# Patient Record
Sex: MALE | Race: WHITE | NOT HISPANIC OR LATINO | Employment: UNEMPLOYED | URBAN - METROPOLITAN AREA
[De-identification: names, ages, dates, MRNs, and addresses within clinical notes are randomized per-mention and may not be internally consistent; named-entity substitution may affect disease eponyms.]

---

## 2023-09-19 ENCOUNTER — HOSPITAL ENCOUNTER (INPATIENT)
Facility: HOSPITAL | Age: 18
LOS: 17 days | Discharge: NURSING, REHAB OR LONG-TERM CARE | DRG: 753 | End: 2023-10-09
Attending: EMERGENCY MEDICINE | Admitting: PSYCHIATRY & NEUROLOGY
Payer: COMMERCIAL

## 2023-09-19 DIAGNOSIS — Z00.8 ENCOUNTER FOR PSYCHOLOGICAL EVALUATION: ICD-10-CM

## 2023-09-19 DIAGNOSIS — E55.9 VITAMIN D DEFICIENCY: ICD-10-CM

## 2023-09-19 DIAGNOSIS — R46.89 AGGRESSION: ICD-10-CM

## 2023-09-19 DIAGNOSIS — F39 MOOD DISORDER (HCC): Primary | ICD-10-CM

## 2023-09-19 DIAGNOSIS — F41.9 ANXIETY: ICD-10-CM

## 2023-09-19 LAB
AMPHETAMINES SERPL QL SCN: NEGATIVE
BARBITURATES UR QL: NEGATIVE
BENZODIAZ UR QL: NEGATIVE
COCAINE UR QL: NEGATIVE
ETHANOL EXG-MCNC: 0 MG/DL
METHADONE UR QL: NEGATIVE
OPIATES UR QL SCN: NEGATIVE
OXYCODONE+OXYMORPHONE UR QL SCN: NEGATIVE
PCP UR QL: NEGATIVE
THC UR QL: NEGATIVE

## 2023-09-19 PROCEDURE — 99283 EMERGENCY DEPT VISIT LOW MDM: CPT

## 2023-09-19 PROCEDURE — 80307 DRUG TEST PRSMV CHEM ANLYZR: CPT | Performed by: EMERGENCY MEDICINE

## 2023-09-19 PROCEDURE — 99285 EMERGENCY DEPT VISIT HI MDM: CPT | Performed by: EMERGENCY MEDICINE

## 2023-09-19 PROCEDURE — 82075 ASSAY OF BREATH ETHANOL: CPT | Performed by: EMERGENCY MEDICINE
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
## 2023-09-20 PROCEDURE — 99245 OFF/OP CONSLTJ NEW/EST HI 55: CPT | Performed by: PSYCHIATRY & NEUROLOGY

## 2023-09-20 RX ORDER — RISPERIDONE 1 MG/1
1 TABLET, ORALLY DISINTEGRATING ORAL
Status: DISCONTINUED | OUTPATIENT
Start: 2023-09-20 | End: 2023-09-23

## 2023-09-20 RX ORDER — BUSPIRONE HYDROCHLORIDE 15 MG/1
15 TABLET ORAL 2 TIMES DAILY
Status: DISCONTINUED | OUTPATIENT
Start: 2023-09-20 | End: 2023-10-09 | Stop reason: HOSPADM

## 2023-09-20 RX ORDER — FLUOXETINE HYDROCHLORIDE 40 MG/1
40 CAPSULE ORAL DAILY
COMMUNITY
End: 2023-10-09

## 2023-09-20 RX ORDER — BUSPIRONE HYDROCHLORIDE 15 MG/1
15 TABLET ORAL 2 TIMES DAILY
Status: ON HOLD | COMMUNITY
End: 2023-10-09 | Stop reason: SDUPTHER

## 2023-09-20 RX ORDER — HALOPERIDOL 5 MG/ML
5 INJECTION INTRAMUSCULAR
Status: DISCONTINUED | OUTPATIENT
Start: 2023-09-20 | End: 2023-09-22

## 2023-09-20 RX ORDER — TRAZODONE HYDROCHLORIDE 50 MG/1
50 TABLET ORAL
Status: DISCONTINUED | OUTPATIENT
Start: 2023-09-20 | End: 2023-10-07

## 2023-09-20 RX ORDER — BENZTROPINE MESYLATE 1 MG/ML
1 INJECTION INTRAMUSCULAR; INTRAVENOUS EVERY 6 HOURS PRN
Status: DISCONTINUED | OUTPATIENT
Start: 2023-09-20 | End: 2023-09-22

## 2023-09-20 RX ORDER — HALOPERIDOL 5 MG/ML
2 INJECTION INTRAMUSCULAR
Status: DISCONTINUED | OUTPATIENT
Start: 2023-09-20 | End: 2023-09-22

## 2023-09-20 RX ORDER — BENZTROPINE MESYLATE 1 MG/1
1 TABLET ORAL EVERY 6 HOURS PRN
Status: DISCONTINUED | OUTPATIENT
Start: 2023-09-20 | End: 2023-09-22

## 2023-09-20 RX ORDER — RISPERIDONE 1 MG/1
1 TABLET, ORALLY DISINTEGRATING ORAL
Status: DISCONTINUED | OUTPATIENT
Start: 2023-09-20 | End: 2023-09-22

## 2023-09-20 RX ORDER — FLUOXETINE 20 MG/5ML
40 SOLUTION ORAL DAILY
Status: DISCONTINUED | OUTPATIENT
Start: 2023-09-20 | End: 2023-09-22 | Stop reason: SDUPTHER

## 2023-09-20 RX ORDER — TRAZODONE HYDROCHLORIDE 50 MG/1
50 TABLET ORAL
COMMUNITY
End: 2023-10-09

## 2023-09-20 RX ORDER — RISPERIDONE 0.5 MG/1
0.5 TABLET ORAL
Status: DISCONTINUED | OUTPATIENT
Start: 2023-09-20 | End: 2023-09-22

## 2023-09-20 RX ADMIN — BUSPIRONE HYDROCHLORIDE 15 MG: 10 TABLET ORAL at 10:30

## 2023-09-20 RX ADMIN — TRAZODONE HYDROCHLORIDE 50 MG: 50 TABLET ORAL at 23:02

## 2023-09-20 RX ADMIN — RISPERIDONE 1 MG: 1 TABLET, ORALLY DISINTEGRATING ORAL at 23:02

## 2023-09-20 RX ADMIN — FLUOXETINE 40 MG: 20 SOLUTION ORAL at 10:30

## 2023-09-20 RX ADMIN — BUSPIRONE HYDROCHLORIDE 15 MG: 10 TABLET ORAL at 18:05

## 2023-09-20 NOTE — ED NOTES
Assumed care for pt. Pt currently sleeping with no s/s of distress observed. Continues on Q15 for safety.      Valentine Ravi RN  09/20/23 0591

## 2023-09-20 NOTE — ED NOTES
Insurance Authorization for admission:   [facility] Phone number: [phone]. Spoke to [facility]  5 days approved. Level of care:  Inpatient admission  Review on **. Authorization # **.

## 2023-09-20 NOTE — ED CARE HANDOFF
Emergency Department Sign Out Note        Sign out and transfer of care from Dr. Lionel Montenegro. See Separate Emergency Department note. The patient, Leslee Hamm, was evaluated by the previous provider for [facility]. Workup Completed:  12     ED Course / Workup Pending (followup):  Pending placement. Procedures  MDM        Disposition  Final diagnoses:   Encounter for psychological evaluation   Aggression     Time reflects when diagnosis was documented in both MDM as applicable and the Disposition within this note     Time User Action Codes Description Comment    9/19/2023  1:56 PM Romel Hannah [address] [Z00.8] Encounter for psychological evaluation     9/19/2023  1:56 PM Byron Barton Add [R46.89] Aggression       ED Disposition     ED Disposition   Transfer to [address] [place] [address]   --    Date/Time   Tue Sep 19, 2023  1:57 PM    Comment   Leslee Hamm should be transferred out to [facility] and has been medically cleared. Follow-up Information    None       Patient's Medications   Discharge Prescriptions    No medications on file     No discharge procedures on file.        ED Provider  Electronically Signed by     Sarah Mustafa MD  09/20/23 3106

## 2023-09-20 NOTE — ED NOTES
Bed search failed due to ID, ASD and reported violence, impulsive behavior and aggressiveness. [facility] may have a small unit approp. for level of need, however, they have no available beds today. Kathy Allen is reviewing.

## 2023-09-20 NOTE — ED NOTES
[place]    Recipient ID: [id number]    [address] [place] [address]  Information Contact  Telephone: [phone]    [facility]  Information Contact  Telephone: [phone]

## 2023-09-20 NOTE — ED NOTES
Pt vitals taken and fluids/snacks given @ bedside. Tolerating well. Pt is comfortably resting and watching t.v. @ this time. All safety precautions are in place and pt has no other needs @ this time.      Janae Schuster  09/19/23 0124
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
## 2023-09-21 PROCEDURE — 99215 OFFICE O/P EST HI 40 MIN: CPT | Performed by: PSYCHIATRY & NEUROLOGY

## 2023-09-21 RX ADMIN — RISPERIDONE 1 MG: 1 TABLET, ORALLY DISINTEGRATING ORAL at 22:03

## 2023-09-21 RX ADMIN — TRAZODONE HYDROCHLORIDE 50 MG: 50 TABLET ORAL at 22:02

## 2023-09-21 RX ADMIN — BUSPIRONE HYDROCHLORIDE 15 MG: 10 TABLET ORAL at 17:51

## 2023-09-21 RX ADMIN — FLUOXETINE 40 MG: 20 SOLUTION ORAL at 09:00

## 2023-09-21 RX ADMIN — BUSPIRONE HYDROCHLORIDE 15 MG: 10 TABLET ORAL at 08:59

## 2023-09-21 NOTE — PROGRESS NOTES
Progress Note - Behavioral Health   Geneva Acosta 19 y.o. male MRN: [id number]  Unit/Bed#: ED 10 Encounter: [id number]    Principal Psychiatric Problem:  1. Autism spectrum disorder  2. Impulse control disorder, unspecified; consideration for intermittent explosive disorders vs. Affective disorder like bipolar disorder     Active Problems: There are no active Hospital Problems. Plan  Discussed plan with primary team as follows:  1. Patient has signed 61 51 81 for voluntary admission, awaiting inpatient psychiatry placement, Please re-consult psychiatry if patient attempts to leave AMA, seeing as criteria is present for 302; patient poses danger to peers   2. Recommend the following changes to psychiatric medications at this time:  a. Continue Risperdal 1 mg QHS for impulsivity and aggression  b. Continue Prozac 40 mg daily   c. Continue Buspar 15 mg BID   d. Continue Trazodone 50 mg QHS   3. Please reach out to on-call Psychiatry team via [employer], or if after hours/Fri/Sat/Sunday contact on-call psychiatric service via [address] [place] [address] ([phone]) with any questions or concerns. ------------------------------------------------------------    Subjective:     Patient was seen and evaluated for continuity of care. The patient was laying comfortably in his bed watching TV. He tells this writer his mood is "doing good". He denies any complaints. He is currently denying suicidal and homicidal ideation. He also denies auditory and visual hallucinations. He denies any adverse effects from starting the Risperdal yesterday. He reports his sleep and appetite are good. He is looking forward to being placed. Psychiatric Review of Systems:  Behavior over the last 24 hours: improved  Sleep: normal  Appetite: adequate  Medication side effects: none verbalized  ROS: Complete review of systems is negative except as noted above.     Vital signs in last 24 hours:  Temp:  [98.9 °F (37.2 °C)] 98.9 °F (37.2 °C)  HR:  [78-96] 94  Resp:  [18-20] 19  BP: (133-169)/(70-89) 169/89    Mental Status Exam:  Appearance:  alert, fair eye contact, appears stated age, appropriate grooming and hygiene, obese, bearded and wearing paper scrubs,  male    Behavior:  calm, cooperative and guarded at times   Motor: no abnormal movements   Speech:  spontaneous, soft, scant and coherent   Mood:  "doing good"   Affect:  constricted   Thought Process:  Organized, logical, goal-directed   Thought Content: no verbalized delusions or overt paranoia   Perceptual disturbances: no reported hallucinations, denies current hallucinations and does not appear to be responding to internal stimuli at this time   Risk Potential: No active or passive suicidal or homicidal ideation was verbalized during interview   Cognition: oriented to self and situation, appears to be below average intelligence and cognition not formally tested   Insight:  Poor   Judgment: Limited     Current Medications: All current medications have been reviewed. Current Facility-Administered Medications   Medication Dose Route Frequency Provider Last Rate   • benztropine  1 mg Intramuscular Q6H PRN Georgia C Holter, DO     • benztropine  1 mg Oral Q6H PRN Yajaira Stevenson MD     • busPIRone  15 mg Oral BID Holly Fonseca MD     • FLUoxetine  40 mg Oral Daily Holly Fonseca MD     • risperiDONE  0.5 mg Oral Q6H PRN Max 3/day Yajaira Stevenson MD      Or   • haloperidol lactate  2 mg Intramuscular Q6H PRN Max 3/day Yajaira Stevenson MD     • risperiDONE  1 mg Oral Q6H PRN Max 3/day Yajaira Stevenson MD      Or   • haloperidol lactate  5 mg Intramuscular Q6H PRN Max 3/day Yajaira Stevenson MD     • risperiDONE  1 mg Oral HS Yajaira Stevenson MD     • traZODone  50 mg Oral HS Holly Fonseca MD         Laboratory results:  I have personally reviewed all pertinent laboratory/tests results.   Recent Results (from the past 48 hour(s))   POCT alcohol breath test    Collection Time: 09/19/23  2:11 PM   Result Value Ref Range    EXTBreath Alcohol 0.000    Rapid drug screen, urine    Collection Time: 09/19/23  4:37 PM   Result Value Ref Range    Amph/Meth UR Negative Negative    Barbiturate Ur Negative Negative    Benzodiazepine Urine Negative Negative    Cocaine Urine Negative Negative    Methadone Urine Negative Negative    Opiate Urine Negative Negative    PCP Ur Negative Negative    THC Urine Negative Negative    Oxycodone Urine Negative Negative        Lizzy Grijalva MD  Psychiatry Residency, PGY-2

## 2023-09-21 NOTE — ED CARE HANDOFF
Emergency Department Sign Out Note        Sign out and transfer of care from Dr. Gudelia Brush. See Separate Emergency Department note. The patient, Elio Oliver, was evaluated by the previous provider for psych eval.    Workup Completed:  See previous provider's note    ED Course / Workup Pending (followup):  Pending bed placement. 201 with grounds for 302 if needed                                  ED Course as of 09/21/23 0619   Wed Sep 20, 2023   2256 [phone], [facility], requesting a call in case patient is d/c     Procedures  Medical Decision Making  Patient is a 57-year-old male who came in for psychiatric evaluation. Is currently pending bed placement. Patient is currently a 12, though there is grounds for 302 if patient attempts to revoke his 201. Quiet throughout the night    Amount and/or Complexity of Data Reviewed  Labs: ordered. Risk  Decision regarding hospitalization. Disposition  Final diagnoses:   Encounter for psychological evaluation   Aggression     Time reflects when diagnosis was documented in both MDM as applicable and the Disposition within this note     Time User Action Codes Description Comment    9/19/2023  1:56 PM [employer], [employer] [Z00.8] Encounter for psychological evaluation     9/19/2023  1:56 PM [employer], [employer] [R46.89] Aggression       ED Disposition     ED Disposition   Transfer to [address] [place] [address]   --    Date/Time   Tue Sep 19, 2023  1:57 PM    Comment   Elio Oliver should be transferred out to [facility] and has been medically cleared. Follow-up Information    None       Patient's Medications   Discharge Prescriptions    No medications on file     No discharge procedures on file.        ED Provider  Electronically Signed by     Eveline Hood PA-C  09/21/23 5016

## 2023-09-21 NOTE — ED NOTES
Updated supervisor in group home, Karen Graffs, with situation.   Keep him updated, at [phone]  Ping Hickman is very supportive and states that his plan is that patient return to the group home when his is ready

## 2023-09-22 PROBLEM — F32.A DEPRESSION: Status: ACTIVE | Noted: 2023-07-21

## 2023-09-22 PROBLEM — F39 MOOD DISORDER (HCC): Status: ACTIVE | Noted: 2023-07-21

## 2023-09-22 PROBLEM — F63.81 INTERMITTENT EXPLOSIVE DISORDER IN ADULT: Status: ACTIVE | Noted: 2023-07-21

## 2023-09-22 PROBLEM — E66.9 OBESITY (BMI 35.0-39.9 WITHOUT COMORBIDITY): Status: ACTIVE | Noted: 2023-09-22

## 2023-09-22 PROBLEM — F90.9 ADHD (ATTENTION DEFICIT HYPERACTIVITY DISORDER): Chronic | Status: ACTIVE | Noted: 2023-07-21

## 2023-09-22 PROBLEM — F84.0 AUTISM SPECTRUM DISORDER: Chronic | Status: ACTIVE | Noted: 2023-09-22

## 2023-09-22 PROBLEM — F39 MOOD DISORDER (HCC): Chronic | Status: ACTIVE | Noted: 2023-07-21

## 2023-09-22 PROBLEM — Z00.8 MEDICAL CLEARANCE FOR PSYCHIATRIC ADMISSION: Status: ACTIVE | Noted: 2023-09-22

## 2023-09-22 PROBLEM — F41.9 ANXIETY: Status: ACTIVE | Noted: 2023-07-21

## 2023-09-22 PROBLEM — F63.81 INTERMITTENT EXPLOSIVE DISORDER IN ADULT: Chronic | Status: ACTIVE | Noted: 2023-07-21

## 2023-09-22 PROBLEM — F90.9 ADHD (ATTENTION DEFICIT HYPERACTIVITY DISORDER): Status: ACTIVE | Noted: 2023-07-21

## 2023-09-22 PROCEDURE — 93005 ELECTROCARDIOGRAM TRACING: CPT

## 2023-09-22 PROCEDURE — 99253 IP/OBS CNSLTJ NEW/EST LOW 45: CPT | Performed by: PHYSICIAN ASSISTANT

## 2023-09-22 PROCEDURE — 99214 OFFICE O/P EST MOD 30 MIN: CPT | Performed by: PSYCHIATRY & NEUROLOGY

## 2023-09-22 RX ORDER — BENZTROPINE MESYLATE 1 MG/1
1 TABLET ORAL
Status: DISCONTINUED | OUTPATIENT
Start: 2023-09-22 | End: 2023-10-09 | Stop reason: HOSPADM

## 2023-09-22 RX ORDER — AMOXICILLIN 250 MG
1 CAPSULE ORAL DAILY PRN
Status: DISCONTINUED | OUTPATIENT
Start: 2023-09-22 | End: 2023-10-09 | Stop reason: HOSPADM

## 2023-09-22 RX ORDER — PROPRANOLOL HYDROCHLORIDE 10 MG/1
10 TABLET ORAL EVERY 8 HOURS PRN
Status: DISCONTINUED | OUTPATIENT
Start: 2023-09-22 | End: 2023-10-09 | Stop reason: HOSPADM

## 2023-09-22 RX ORDER — HYDROXYZINE 50 MG/1
100 TABLET, FILM COATED ORAL
Status: DISCONTINUED | OUTPATIENT
Start: 2023-09-22 | End: 2023-10-09 | Stop reason: HOSPADM

## 2023-09-22 RX ORDER — OLANZAPINE 5 MG/1
5 TABLET ORAL
Status: DISCONTINUED | OUTPATIENT
Start: 2023-09-22 | End: 2023-10-09 | Stop reason: HOSPADM

## 2023-09-22 RX ORDER — OLANZAPINE 10 MG/1
10 TABLET ORAL
Status: DISCONTINUED | OUTPATIENT
Start: 2023-09-22 | End: 2023-10-09 | Stop reason: HOSPADM

## 2023-09-22 RX ORDER — ACETAMINOPHEN 325 MG/1
975 TABLET ORAL EVERY 6 HOURS PRN
Status: DISCONTINUED | OUTPATIENT
Start: 2023-09-22 | End: 2023-10-09 | Stop reason: HOSPADM

## 2023-09-22 RX ORDER — BENZTROPINE MESYLATE 1 MG/ML
1 INJECTION INTRAMUSCULAR; INTRAVENOUS
Status: DISCONTINUED | OUTPATIENT
Start: 2023-09-22 | End: 2023-10-09 | Stop reason: HOSPADM

## 2023-09-22 RX ORDER — OLANZAPINE 10 MG/2ML
10 INJECTION, POWDER, FOR SOLUTION INTRAMUSCULAR
Status: DISCONTINUED | OUTPATIENT
Start: 2023-09-22 | End: 2023-10-09 | Stop reason: HOSPADM

## 2023-09-22 RX ORDER — OLANZAPINE 2.5 MG/1
2.5 TABLET ORAL
Status: DISCONTINUED | OUTPATIENT
Start: 2023-09-22 | End: 2023-10-09 | Stop reason: HOSPADM

## 2023-09-22 RX ORDER — HYDROXYZINE HYDROCHLORIDE 25 MG/1
25 TABLET, FILM COATED ORAL
Status: DISCONTINUED | OUTPATIENT
Start: 2023-09-22 | End: 2023-10-09 | Stop reason: HOSPADM

## 2023-09-22 RX ORDER — FLUOXETINE HYDROCHLORIDE 20 MG/1
40 CAPSULE ORAL DAILY
Status: DISCONTINUED | OUTPATIENT
Start: 2023-09-23 | End: 2023-09-23

## 2023-09-22 RX ORDER — DIPHENHYDRAMINE HYDROCHLORIDE 50 MG/ML
50 INJECTION INTRAMUSCULAR; INTRAVENOUS EVERY 6 HOURS PRN
Status: DISCONTINUED | OUTPATIENT
Start: 2023-09-22 | End: 2023-10-09 | Stop reason: HOSPADM

## 2023-09-22 RX ORDER — HYDROXYZINE 50 MG/1
50 TABLET, FILM COATED ORAL
Status: DISCONTINUED | OUTPATIENT
Start: 2023-09-22 | End: 2023-10-09 | Stop reason: HOSPADM

## 2023-09-22 RX ORDER — TRAZODONE HYDROCHLORIDE 50 MG/1
50 TABLET ORAL
Status: DISCONTINUED | OUTPATIENT
Start: 2023-09-22 | End: 2023-10-09 | Stop reason: HOSPADM

## 2023-09-22 RX ORDER — LORAZEPAM 2 MG/ML
2 INJECTION INTRAMUSCULAR EVERY 6 HOURS PRN
Status: DISCONTINUED | OUTPATIENT
Start: 2023-09-22 | End: 2023-10-09 | Stop reason: HOSPADM

## 2023-09-22 RX ORDER — MAGNESIUM HYDROXIDE/ALUMINUM HYDROXICE/SIMETHICONE 120; 1200; 1200 MG/30ML; MG/30ML; MG/30ML
30 SUSPENSION ORAL EVERY 4 HOURS PRN
Status: DISCONTINUED | OUTPATIENT
Start: 2023-09-22 | End: 2023-10-09 | Stop reason: HOSPADM

## 2023-09-22 RX ORDER — OLANZAPINE 10 MG/2ML
5 INJECTION, POWDER, FOR SOLUTION INTRAMUSCULAR
Status: DISCONTINUED | OUTPATIENT
Start: 2023-09-22 | End: 2023-10-09 | Stop reason: HOSPADM

## 2023-09-22 RX ORDER — ACETAMINOPHEN 325 MG/1
650 TABLET ORAL EVERY 6 HOURS PRN
Status: DISCONTINUED | OUTPATIENT
Start: 2023-09-22 | End: 2023-10-09 | Stop reason: HOSPADM

## 2023-09-22 RX ORDER — ACETAMINOPHEN 325 MG/1
650 TABLET ORAL EVERY 4 HOURS PRN
Status: DISCONTINUED | OUTPATIENT
Start: 2023-09-22 | End: 2023-10-09 | Stop reason: HOSPADM

## 2023-09-22 RX ORDER — LANOLIN ALCOHOL/MO/W.PET/CERES
3 CREAM (GRAM) TOPICAL
Status: DISCONTINUED | OUTPATIENT
Start: 2023-09-22 | End: 2023-10-07

## 2023-09-22 RX ADMIN — BUSPIRONE HYDROCHLORIDE 15 MG: 10 TABLET ORAL at 08:12

## 2023-09-22 RX ADMIN — MELATONIN TAB 3 MG 3 MG: 3 TAB at 21:03

## 2023-09-22 RX ADMIN — RISPERIDONE 1 MG: 1 TABLET, ORALLY DISINTEGRATING ORAL at 21:03

## 2023-09-22 RX ADMIN — FLUOXETINE 40 MG: 20 SOLUTION ORAL at 08:11

## 2023-09-22 RX ADMIN — BUSPIRONE HYDROCHLORIDE 15 MG: 10 TABLET ORAL at 17:11

## 2023-09-22 RX ADMIN — TRAZODONE HYDROCHLORIDE 50 MG: 50 TABLET ORAL at 21:03

## 2023-09-22 NOTE — CASE MANAGEMENT
Malinda Ortega, [address] [facility], [phone]; called and left a voicemail with admission notification. Left contact information, requested call back for care coordination. Continue to monitor.
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
## 2023-09-22 NOTE — ED NOTES
Patient is accepted at [facility]. Patient is accepted by Chiara Littlejohn MD.      Patient may go to the floor at [facility]. Nurse report is to be called at ** to  prior to patient transfer.

## 2023-09-22 NOTE — ED NOTES
Call placed to SHENG Wilcox of [employer], [phone]. Informed him of the patient's admission to [address]. ED Crisis provided the phone number for the nurses' desk of that unit.

## 2023-09-22 NOTE — PROGRESS NOTES
Progress Note - Behavioral Health   Wesly Johnson 19 y.o. male MRN: [id number]  Unit/Bed#: ED 10 Encounter: [id number]    Principal Psychiatric Problem:  1. Autism spectrum disorder  2. Impulse control disorder, unspecified; consideration for intermittent explosive disorders vs. Affective disorder like bipolar disorder     Active Problems: There are no active Hospital Problems. Plan  Discussed plan with primary team as follows:  1. Patient has signed 61 51 81 for voluntary admission, awaiting inpatient psychiatry placement, Please re-consult psychiatry if patient attempts to leave AMA, seeing as criteria is present for 302; patient poses danger to peers   2. Recommend the following changes to psychiatric medications at this time:  a. Continue Risperdal 1 mg QHS for impulsivity and aggression  b. Continue Prozac 40 mg daily   c. Continue Buspar 15 mg BID   d. Continue Trazodone 50 mg QHS   3. Please reach out to on-call Psychiatry team via [employer], or if after hours/Fri/Sat/Sunday contact on-call psychiatric service via [address] [place] [address] ([phone]) with any questions or concerns. ------------------------------------------------------------    Subjective:     Patient was seen and evaluated for continuity of care. The patient was laying comfortably in his bed watching TV. He tells this writer his mood is "feeling good". He denies any complaints. He is currently denying suicidal and homicidal ideation. He also denies auditory and visual hallucinations. He denies any adverse effects from starting the Risperdal yesterday. He reports his sleep as good but did admit to still feeling  Tired. He describes his appetite as good. He is looking forward to being placed. He tells this writer his goal is to get better and receive treatment.       Psychiatric Review of Systems:  Behavior over the last 24 hours: improved  Sleep: normal  Appetite: adequate  Medication side effects: none verbalized  ROS: Complete review of systems is negative except as noted above. Vital signs in last 24 hours:  HR:  [66-93] 93  Resp:  [16-18] 16  BP: (102-154)/(63-81) 124/63    Mental Status Exam:  Appearance:  alert, fair eye contact, appears stated age, appropriate grooming and hygiene, obese, bearded and wearing paper scrubs,  male    Behavior:  calm, cooperative and guarded at times   Motor: no abnormal movements   Speech:  spontaneous, soft, scant and coherent   Mood:  "feeling good"   Affect:  constricted   Thought Process:  Organized, logical, goal-directed   Thought Content: no verbalized delusions or overt paranoia   Perceptual disturbances: no reported hallucinations, denies current hallucinations and does not appear to be responding to internal stimuli at this time   Risk Potential: No active or passive suicidal or homicidal ideation was verbalized during interview   Cognition: oriented to self and situation, appears to be below average intelligence and cognition not formally tested   Insight:  Poor   Judgment: Limited     Current Medications: All current medications have been reviewed. Current Facility-Administered Medications   Medication Dose Route Frequency Provider Last Rate   • benztropine  1 mg Intramuscular Q6H  [address] C Holter, DO     • benztropine  1 mg Oral Q6H PRN Deacon Rich MD     • busPIRone  15 mg Oral BID Markie Reeder MD     • FLUoxetine  40 mg Oral Daily Markie Reeder MD     • risperiDONE  0.5 mg Oral Q6H PRN Max 3/day Deacon Rich MD      Or   • haloperidol lactate  2 mg Intramuscular Q6H PRN Max 3/day Deacon Rich MD     • risperiDONE  1 mg Oral Q6H PRN Max 3/day Deacon Rich MD      Or   • haloperidol lactate  5 mg Intramuscular Q6H PRN Max 3/day Deacon Rich MD     • risperiDONE  1 mg Oral HS Deacon Rich MD     • traZODone  50 mg Oral HS Markie Reeder MD         Laboratory results:  I have personally reviewed all pertinent laboratory/tests results.   No results found for this or any previous visit (from the past 50 hour(s)).      Kaity Anna MD  Psychiatry Residency, PGY-2

## 2023-09-22 NOTE — DISCHARGE INSTR - OTHER ORDERS
You will be discharged with group home staff to [facility] located at [address] [facility] around 2 PM.     Meds be sent to [facility] pharmacy at [address]    After discharge, if you find your coping skills are not as effective and you continue to feel distressed please call [facility] services are available 24 hours a day by calling [facility] AT [place] at [phone], and [address] : [phone]    [address] [facility] : [phone]     If you feel you are a danger to yourself or others please contact 911 or go to nearest Emergency room to seek immediate help. Celia Caballero or Marilyn, our [employer], will be calling you after your discharge, on the phone number that you provided. They will be available as an additional support, if needed. If you wish to speak with one of them, you may contact Celia Caballero at [phone] or Rickey Goldman at [phone].

## 2023-09-22 NOTE — ASSESSMENT & PLAN NOTE
· Vital signs stable. No admission labs available for review at the time of this consultation  Patient is medically cleared for admission to the [facility] for treatment of the underlying psychiatric illness  Slim we will sign off.   Please call with questions or concerns

## 2023-09-23 LAB
25(OH)D3 SERPL-MCNC: 20.3 NG/ML (ref 30–100)
ALBUMIN SERPL BCP-MCNC: 4.3 G/DL (ref 3.5–5)
ALP SERPL-CCNC: 35 U/L (ref 34–104)
ALT SERPL W P-5'-P-CCNC: 37 U/L (ref 7–52)
ANION GAP SERPL CALCULATED.3IONS-SCNC: 9 MMOL/L
AST SERPL W P-5'-P-CCNC: 24 U/L (ref 13–39)
BASOPHILS # BLD AUTO: 0.04 THOUSANDS/ÂΜL (ref 0–0.1)
BASOPHILS NFR BLD AUTO: 1 % (ref 0–1)
BILIRUB SERPL-MCNC: 0.83 MG/DL (ref 0.2–1)
BILIRUB UR QL STRIP: NEGATIVE
BUN SERPL-MCNC: 13 MG/DL (ref 5–25)
CALCIUM SERPL-MCNC: 9.7 MG/DL (ref 8.4–10.2)
CHLORIDE SERPL-SCNC: 100 MMOL/L (ref 96–108)
CHOLEST SERPL-MCNC: 164 MG/DL
CLARITY UR: CLEAR
CO2 SERPL-SCNC: 29 MMOL/L (ref 21–32)
COLOR UR: ABNORMAL
CREAT SERPL-MCNC: 0.69 MG/DL (ref 0.6–1.3)
EOSINOPHIL # BLD AUTO: 0.22 THOUSAND/ÂΜL (ref 0–0.61)
EOSINOPHIL NFR BLD AUTO: 4 % (ref 0–6)
ERYTHROCYTE [DISTWIDTH] IN BLOOD BY AUTOMATED COUNT: 11.9 % (ref 11.6–15.1)
EST. AVERAGE GLUCOSE BLD GHB EST-MCNC: 105 MG/DL
GFR SERPL CREATININE-BSD FRML MDRD: 136 ML/MIN/1.73SQ M
GLUCOSE P FAST SERPL-MCNC: 105 MG/DL (ref 65–99)
GLUCOSE SERPL-MCNC: 105 MG/DL (ref 65–140)
GLUCOSE UR STRIP-MCNC: NEGATIVE MG/DL
HBA1C MFR BLD: 5.3 %
HCT VFR BLD AUTO: 48.7 % (ref 36.5–49.3)
HDLC SERPL-MCNC: 42 MG/DL
HGB BLD-MCNC: 16.7 G/DL (ref 12–17)
HGB UR QL STRIP.AUTO: NEGATIVE
IMM GRANULOCYTES # BLD AUTO: 0.01 THOUSAND/UL (ref 0–0.2)
IMM GRANULOCYTES NFR BLD AUTO: 0 % (ref 0–2)
KETONES UR STRIP-MCNC: NEGATIVE MG/DL
LDLC SERPL CALC-MCNC: 98 MG/DL (ref 0–100)
LEUKOCYTE ESTERASE UR QL STRIP: NEGATIVE
LYMPHOCYTES # BLD AUTO: 2.55 THOUSANDS/ÂΜL (ref 0.6–4.47)
LYMPHOCYTES NFR BLD AUTO: 49 % (ref 14–44)
MCH RBC QN AUTO: 30 PG (ref 26.8–34.3)
MCHC RBC AUTO-ENTMCNC: 34.3 G/DL (ref 31.4–37.4)
MCV RBC AUTO: 87 FL (ref 82–98)
MONOCYTES # BLD AUTO: 0.49 THOUSAND/ÂΜL (ref 0.17–1.22)
MONOCYTES NFR BLD AUTO: 9 % (ref 4–12)
NEUTROPHILS # BLD AUTO: 1.91 THOUSANDS/ÂΜL (ref 1.85–7.62)
NEUTS SEG NFR BLD AUTO: 37 % (ref 43–75)
NITRITE UR QL STRIP: NEGATIVE
NONHDLC SERPL-MCNC: 122 MG/DL
NRBC BLD AUTO-RTO: 0 /100 WBCS
PH UR STRIP.AUTO: 6 [PH]
PLATELET # BLD AUTO: 258 THOUSANDS/UL (ref 149–390)
PMV BLD AUTO: 9.3 FL (ref 8.9–12.7)
POTASSIUM SERPL-SCNC: 3.8 MMOL/L (ref 3.5–5.3)
PROT SERPL-MCNC: 6.9 G/DL (ref 6.4–8.4)
PROT UR STRIP-MCNC: NEGATIVE MG/DL
RBC # BLD AUTO: 5.57 MILLION/UL (ref 3.88–5.62)
SODIUM SERPL-SCNC: 138 MMOL/L (ref 135–147)
SP GR UR STRIP.AUTO: 1.02 (ref 1–1.04)
TRIGL SERPL-MCNC: 120 MG/DL
TSH SERPL DL<=0.05 MIU/L-ACNC: 0.77 UIU/ML (ref 0.45–4.5)
UROBILINOGEN UA: NEGATIVE MG/DL
WBC # BLD AUTO: 5.22 THOUSAND/UL (ref 4.31–10.16)

## 2023-09-23 PROCEDURE — 82306 VITAMIN D 25 HYDROXY: CPT | Performed by: PSYCHIATRY & NEUROLOGY

## 2023-09-23 PROCEDURE — 99223 1ST HOSP IP/OBS HIGH 75: CPT | Performed by: HOSPITALIST

## 2023-09-23 PROCEDURE — 84443 ASSAY THYROID STIM HORMONE: CPT | Performed by: PSYCHIATRY & NEUROLOGY

## 2023-09-23 PROCEDURE — 85025 COMPLETE CBC W/AUTO DIFF WBC: CPT | Performed by: PSYCHIATRY & NEUROLOGY

## 2023-09-23 PROCEDURE — 86780 TREPONEMA PALLIDUM: CPT | Performed by: PSYCHIATRY & NEUROLOGY

## 2023-09-23 PROCEDURE — 80053 COMPREHEN METABOLIC PANEL: CPT | Performed by: PSYCHIATRY & NEUROLOGY

## 2023-09-23 PROCEDURE — 80061 LIPID PANEL: CPT | Performed by: PSYCHIATRY & NEUROLOGY

## 2023-09-23 PROCEDURE — 83036 HEMOGLOBIN GLYCOSYLATED A1C: CPT | Performed by: PSYCHIATRY & NEUROLOGY

## 2023-09-23 RX ORDER — RISPERIDONE 1 MG/1
1 TABLET, ORALLY DISINTEGRATING ORAL 2 TIMES DAILY
Status: DISCONTINUED | OUTPATIENT
Start: 2023-09-23 | End: 2023-09-26

## 2023-09-23 RX ORDER — FLUOXETINE HYDROCHLORIDE 20 MG/1
20 CAPSULE ORAL DAILY
Status: DISCONTINUED | OUTPATIENT
Start: 2023-09-24 | End: 2023-09-26

## 2023-09-23 RX ADMIN — RISPERIDONE 1 MG: 1 TABLET, ORALLY DISINTEGRATING ORAL at 17:22

## 2023-09-23 RX ADMIN — BUSPIRONE HYDROCHLORIDE 15 MG: 10 TABLET ORAL at 08:35

## 2023-09-23 RX ADMIN — MELATONIN TAB 3 MG 3 MG: 3 TAB at 21:17

## 2023-09-23 RX ADMIN — TRAZODONE HYDROCHLORIDE 50 MG: 50 TABLET ORAL at 21:17

## 2023-09-23 RX ADMIN — BUSPIRONE HYDROCHLORIDE 15 MG: 10 TABLET ORAL at 17:22

## 2023-09-23 RX ADMIN — FLUOXETINE 40 MG: 20 CAPSULE ORAL at 08:34

## 2023-09-23 NOTE — NURSING NOTE
Denies SI/HI/SIB/AVH/Pain. Calm and in control. Med compliant. Visible and social. Offers no complaints.

## 2023-09-23 NOTE — TREATMENT PLAN
TREATMENT PLAN REVIEW - Behavioral Health Suad Perez 19 y.o. 2005 male MRN: [id number]    [address] Room / Bed: [place] [address]/[place] [address] Encounter: [id number]          Admit Date/Time:  9/19/2023  1:16 PM    Treatment Team: Attending Provider: Jefferson Go MD; Consulting Physician: Dionte Crandall; : Sheree Corrales; Registered Nurse: Lissa Andre RN; Recreational Therapist: Amari Rossi; Patient Care Assistant: Randy Wynne; Patient Care Assistant: Gera Pedroza; Patient Care Assistant: Rigo Duenas    Diagnosis: Principal Problem:    Mood disorder ([address])  Active Problems:    ADHD (attention deficit hyperactivity disorder)    Intermittent explosive disorder in adult    Autism spectrum disorder    Medical clearance for psychiatric admission    Obesity (BMI 35.0-39.9 without comorbidity)      Patient Strengths/Assets: good physical health, negotiates basic needs, self reliant    Patient Barriers/Limitations: lack of social/family support, patient is on an involuntary commitment, poor insight    Short Term Goals: decrease in level of agitation, ability to stay safe on the unit, ability to stay free of restraints, improvement in insight, improvement in reasoning ability    Long Term Goals: improvement in reasoning ability, improved insight, appropriate interaction with peers    Progress Towards Goals: starting psychiatric medications as prescribed    Recommended Treatment: medication management, patient medication education, group therapy, milieu therapy, supportive therapy, continued Behavioral Health psychiatric evaluation/assessment process    Treatment Frequency: daily medication monitoring, group and milieu therapy daily, monitoring through interdisciplinary rounds, monitoring through weekly patient care conferences    Expected Discharge Date:  9/23/23    Discharge Plan: referrals as indicated    Treatment Plan Created/Updated By: Susan Willard MD

## 2023-09-23 NOTE — ED NOTES
Call received from [facility] from [facility]. Admission to [facility] confirmed beginning 9/22. LCD 9/26. Authorization # [id number].

## 2023-09-23 NOTE — PLAN OF CARE
Problem: Ineffective Coping  Goal: Identifies healthy coping skills  Outcome: Progressing  Goal: Free from restraint events  Description: - Utilize least restrictive measures   - Provide behavioral interventions   - Redirect inappropriate behaviors   Outcome: Progressing     Problem: DISCHARGE PLANNING  Goal: Discharge to home or other facility with appropriate resources  Description: INTERVENTIONS:  - Identify barriers to discharge w/patient and caregiver  - Arrange for needed discharge resources and transportation as appropriate  - Identify discharge learning needs (meds, wound care, etc.)  - Arrange for interpretive services to assist at discharge as needed  - Refer to Case Management Department for coordinating discharge planning if the patient needs post-hospital services based on physician/advanced practitioner order or complex needs related to functional status, cognitive ability, or social support system  Outcome: Progressing     Problem: BEHAVIOR  Goal: Pt/Family maintain appropriate behavior and adhere to behavioral management agreement, if implemented  Description: INTERVENTIONS:  - Assess the family dynamic   - Encourage verbalization of thoughts and concerns in a socially appropriate manner  - Assess patient/family's coping skills and non-compliant behavior (including use of illegal substances). - Utilize positive, consistent limit setting strategies supporting safety of patient, staff and others  - Initiate consult with Case Management, Spiritual Care or other ancillary services as appropriate  - If a patient's/visitor's behavior jeopardizes the safety of the patient, staff, or others, refer to organization procedure.    - Notify Security of behavior or suspected illegal substances which indicate the need for search of the patient and/or belongings  - Encourage participation in the decision making process about a behavioral management agreement; implement if patient meets criteria  Outcome: Progressing     Problem: Risk for Violence/Aggression Toward Others  Goal: Treatment Goal: Refrain from acts of violence/aggression during length of stay, and demonstrate improved impulse control at the time of discharge  Outcome: Progressing  Goal: Refrain from harming others  Outcome: Progressing
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
## 2023-09-23 NOTE — NURSING NOTE
Visible but quiet social with select staff. Cooperative. Denies unmet needs. Denies si.hi. avh. continued q7m checks for safety     Compliant with evening meds

## 2023-09-24 LAB — TREPONEMA PALLIDUM IGG+IGM AB [PRESENCE] IN SERUM OR PLASMA BY IMMUNOASSAY: NORMAL

## 2023-09-24 PROCEDURE — 99232 SBSQ HOSP IP/OBS MODERATE 35: CPT | Performed by: HOSPITALIST

## 2023-09-24 RX ADMIN — TRAZODONE HYDROCHLORIDE 50 MG: 50 TABLET ORAL at 21:01

## 2023-09-24 RX ADMIN — RISPERIDONE 1 MG: 1 TABLET, ORALLY DISINTEGRATING ORAL at 08:27

## 2023-09-24 RX ADMIN — RISPERIDONE 1 MG: 1 TABLET, ORALLY DISINTEGRATING ORAL at 17:09

## 2023-09-24 RX ADMIN — BUSPIRONE HYDROCHLORIDE 15 MG: 10 TABLET ORAL at 08:28

## 2023-09-24 RX ADMIN — BUSPIRONE HYDROCHLORIDE 15 MG: 10 TABLET ORAL at 17:09

## 2023-09-24 RX ADMIN — MELATONIN TAB 3 MG 3 MG: 3 TAB at 21:01

## 2023-09-24 RX ADMIN — FLUOXETINE 20 MG: 20 CAPSULE ORAL at 08:28

## 2023-09-24 NOTE — NURSING NOTE
Denies SI/HI/SIB/AVH/Pain. Remains calm and in control. Cooperative and med compliant. Has remained visible and social with staff and peers. Wearing own clothes. Offers no complaints.

## 2023-09-24 NOTE — NURSING NOTE
Pt isolative to room mostly. Out for needs only. Denies unmet needs. Denies si.hi. avh. compliant with evening meds.  Continued q7m checks for safety

## 2023-09-24 NOTE — PROGRESS NOTES
Progress Note - Behavioral Health     Marco Antonio Oneill 19 y.o. male MRN: [id number]   Unit/Bed#: [facility] 382-01 Encounter: [id number]    Behavior over the last 24 hours: unchanged. Jaime seen today, per staff report has been visible, anger outbursts on the unit. Zachery Foley is seen on exam today subjectively has no complaints. Continues to report he got angry at the group home due to staff speaking about him, is unable to appreciate the level of aggression he displayed and the dangerousness of his behavior. Admits to feeling he has difficulty with frustration tolerance impulsivity and anger however minimizes based on stating when he reaches this point it is warranted against people for their actions. Poor insight and judgment. Sleep and appetite fair.   No reported side effects to medication    Mental Status Evaluation:    Appearance:  casually dressed, adequate grooming, looks stated age, overweight   Behavior:  calm   Speech:  normal rate, normal volume   Mood:  euthymic   Affect:  normal range and intensity, inappropriate   Thought Process:  illogical, circumstantial   Associations: circumstantial associations   Thought Content:  no overt delusions   Perceptual Disturbances: no auditory hallucinations, no visual hallucinations   Risk Potential: Suicidal ideation - None at present  Homicidal ideation - angry, hostile feelings with no homicidal plan   Sensorium:  oriented to person, place and time/date   Memory:  recent and remote memory grossly intact   Consciousness:  alert and awake   Attention: decreased concentration and decreased attention span   Insight:  impaired   Judgment: impaired   Gait/Station: normal gait/station   Motor Activity: no abnormal movements     Vital signs in last 24 hours:    Temp:  [97.5 °F (36.4 °C)-98.1 °F (36.7 °C)] 97.7 °F (36.5 °C)  HR:  [80-89] 83  Resp:  [16-18] 18  BP: (117-120)/(58-77) 120/77    Laboratory results: I have personally reviewed all pertinent laboratory/tests results. Assessment/Plan   Principal Problem:    Mood disorder (HCC)  Active Problems:    ADHD (attention deficit hyperactivity disorder)    Intermittent explosive disorder in adult    Autism spectrum disorder    Medical clearance for psychiatric admission    Obesity (BMI 35.0-39.9 without comorbidity)    Recommended Treatment:     Planned medication and treatment changes:  Risperdal initiated on admission for mood control. Patient refused Depakote. Continue Risperdal 1 mg twice daily  Continue BuSpar 15 mg twice daily  Continue Prozac 20 mg daily, patient was on 40 mg daily as an outpatient but this was reduced due to possible over activation from this.   Continue trazodone 50 mg at bedtime for sleep  All current active medications have been reviewed  Encourage group therapy, milieu therapy and occupational therapy  Behavioral Health checks every 7 minutes  Current Facility-Administered Medications   Medication Dose Route Frequency Provider Last Rate   • acetaminophen  650 mg Oral Q6H PRN Dyan Clemens MD     • acetaminophen  650 mg Oral Q4H PRN Dyan Clemens MD     • acetaminophen  975 mg Oral Q6H PRN Dyan lCemens MD     • aluminum-magnesium hydroxide-simethicone  30 mL Oral Q4H PRN Dyan Clemens MD     • benztropine  1 mg Intramuscular Q4H PRN Max 6/day Dyan Clemens MD     • benztropine  1 mg Oral Q4H PRN Max 6/day Dyan Clemens MD     • busPIRone  15 mg Oral BID Dyan Clmeens MD     • hydrOXYzine HCL  50 mg Oral Q6H PRN Max 4/day Dyan Clemens MD      Or   • diphenhydrAMINE  50 mg Intramuscular Q6H PRN Dyan Clemens MD     • FLUoxetine  20 mg Oral Daily Sherine Nation MD     • hydrOXYzine HCL  100 mg Oral Q6H PRN Max 4/day Dyan Clemens MD      Or   • LORazepam  2 mg Intramuscular Q6H PRN Dyan Clemens MD     • hydrOXYzine HCL  25 mg Oral Q6H PRN Max 4/day Dyan Clemens MD     • melatonin  3 mg Oral HS Dyan Clemens MD     • OLANZapine 10 mg Oral Q3H PRN Max 3/day Brandi Garcia MD      Or   • OLANZapine  10 mg Intramuscular Q3H PRN Max 3/day Brandi Garcia MD     • OLANZapine  5 mg Oral Q3H PRN Max 6/day Brandi Garcia MD      Or   • OLANZapine  5 mg Intramuscular Q3H PRN Max 6/day Brandi Garcia MD     • OLANZapine  2.5 mg Oral Q3H PRN Max 8/day Brandi Garcia MD     • propranolol  10 mg Oral Q8H PRN Brandi Garcia MD     • risperiDONE  1 mg Oral BID Jimi Dumont MD     • senna-docusate sodium  1 tablet Oral Daily PRN Brandi Garcia MD     • traZODone  50 mg Oral HS Brandi Garcia MD     • traZODone  50 mg Oral HS PRN Brandi Garcia MD         Risks / Benefits of Treatment:    Risks, benefits, and possible side effects of medications explained to patient and patient verbalizes understanding and agreement for treatment. Counseling / Coordination of Care: Total floor / unit time spent today 35 minutes. Greater than 50% of total time was spent with the patient and / or family counseling and / or coordination of care. A description of counseling / coordination of care:  Patient's progress discussed with staff in treatment team meeting. Medications, treatment progress and treatment plan reviewed with patient.     Jimi Dumont MD 09/24/23

## 2023-09-24 NOTE — PLAN OF CARE
Problem: Ineffective Coping  Goal: Identifies healthy coping skills  Outcome: Progressing  Goal: Free from restraint events  Description: - Utilize least restrictive measures   - Provide behavioral interventions   - Redirect inappropriate behaviors   Outcome: Progressing     Problem: Risk for Violence/Aggression Toward Others  Goal: Treatment Goal: Refrain from acts of violence/aggression during length of stay, and demonstrate improved impulse control at the time of discharge  Outcome: Progressing  Goal: Refrain from harming others  Outcome: Progressing     Problem: BEHAVIOR  Goal: Pt/Family maintain appropriate behavior and adhere to behavioral management agreement, if implemented  Description: INTERVENTIONS:  - Assess the family dynamic   - Encourage verbalization of thoughts and concerns in a socially appropriate manner  - Assess patient/family's coping skills and non-compliant behavior (including use of illegal substances). - Utilize positive, consistent limit setting strategies supporting safety of patient, staff and others  - Initiate consult with Case Management, Spiritual Care or other ancillary services as appropriate  - If a patient's/visitor's behavior jeopardizes the safety of the patient, staff, or others, refer to organization procedure.    - Notify Security of behavior or suspected illegal substances which indicate the need for search of the patient and/or belongings  - Encourage participation in the decision making process about a behavioral management agreement; implement if patient meets criteria  Outcome: Progressing

## 2023-09-25 LAB
ATRIAL RATE: 86 BPM
P AXIS: 36 DEGREES
PR INTERVAL: 142 MS
QRS AXIS: 83 DEGREES
QRSD INTERVAL: 96 MS
QT INTERVAL: 348 MS
QTC INTERVAL: 416 MS
T WAVE AXIS: 43 DEGREES
VENTRICULAR RATE: 86 BPM

## 2023-09-25 PROCEDURE — 93010 ELECTROCARDIOGRAM REPORT: CPT | Performed by: STUDENT IN AN ORGANIZED HEALTH CARE EDUCATION/TRAINING PROGRAM

## 2023-09-25 PROCEDURE — 99232 SBSQ HOSP IP/OBS MODERATE 35: CPT | Performed by: PSYCHIATRY & NEUROLOGY

## 2023-09-25 RX ORDER — MELATONIN
2000 DAILY
Status: DISCONTINUED | OUTPATIENT
Start: 2023-09-25 | End: 2023-10-09 | Stop reason: HOSPADM

## 2023-09-25 RX ADMIN — CHOLECALCIFEROL TAB 25 MCG (1000 UNIT) 2000 UNITS: 25 TAB at 10:30

## 2023-09-25 RX ADMIN — RISPERIDONE 1 MG: 1 TABLET, ORALLY DISINTEGRATING ORAL at 17:58

## 2023-09-25 RX ADMIN — BUSPIRONE HYDROCHLORIDE 15 MG: 10 TABLET ORAL at 08:07

## 2023-09-25 RX ADMIN — BUSPIRONE HYDROCHLORIDE 15 MG: 10 TABLET ORAL at 17:58

## 2023-09-25 RX ADMIN — TRAZODONE HYDROCHLORIDE 50 MG: 50 TABLET ORAL at 21:18

## 2023-09-25 RX ADMIN — MELATONIN TAB 3 MG 3 MG: 3 TAB at 21:18

## 2023-09-25 RX ADMIN — FLUOXETINE 20 MG: 20 CAPSULE ORAL at 08:07

## 2023-09-25 RX ADMIN — RISPERIDONE 1 MG: 1 TABLET, ORALLY DISINTEGRATING ORAL at 08:07
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
## 2023-09-25 NOTE — TREATMENT TEAM
09/25/23 1102   Team Meeting   Meeting Type Tx Team Meeting   Team Members Present   Team Members Present Physician;Nurse;   Physician Team Member [id number] Team Member Jan Marvin [facility]    Care Management Team Member Daksha   Patient/Family Present   Patient Present Yes   Patient's Family Present No     Treatment plan reviewed. Pt is in agreement and signed treatment plan. Continue to monitor.

## 2023-09-25 NOTE — NURSING NOTE
Patient has been present in the milieu and social with peers, but does spend the majority of his time in his room. Patient did not attend morning groups. Patient denies SI/HI/AH/VH, and is medication compliant. Patient does not like taking medications, but states that he will take them.  No concerns noted at this time

## 2023-09-25 NOTE — NURSING NOTE
Pt calm/cooperative. Pleasant on approach. Pt social with peers. Attending group activities. Denies si.hi. avh. Denies unmet needs. Compliant with evening meds.  continued q7m checks for safety

## 2023-09-25 NOTE — PROGRESS NOTES
BEHAVIORAL HEALTH SERVICE PROGRESS NOTE    Richard Tucker 19 y.o. male MRN: [id number]  Unit/Bed#: [facility] 382-01 Encounter: [id number]         ASSESSMENT/PLAN     Richard Tucker is a 19 y.o. male, with history of mood disorder and autism spectrum disorder, who was admitted to [address] on 302 involuntary commitment for agitation and aggression. Currently, he reports some improvement. Consider decreasing Prozac to 10 mg tomorrow. Principal Problem:    Mood disorder ([address])  Active Problems:    ADHD (attention deficit hyperactivity disorder)    Intermittent explosive disorder in adult    Autism spectrum disorder    Medical clearance for psychiatric admission    Obesity (BMI 35.0-39.9 without comorbidity)      RECOMMENDED TREATMENT     Continue buspirone 15 mg twice daily for anxiety  Continue fluoxetine 20 mg daily for mood  Continue melatonin 3 mg at bedtime for sleep  Continue risperidone 1 mg twice daily for mood/psychosis  Continue trazodone 50 mg at bedtime for sleep   All current active medications have been reviewed  Encourage group therapy, milieu therapy and occupational therapy  Behavioral Health checks every 15 minutes    Risks / Benefits of Treatment: Risks, benefits, and possible side effects of medications explained to patient and patient verbalizes understanding and agreement for treatment. Counseling / Coordination of Care: Patient's progress discussed with staff in treatment team meeting. Medications, treatment progress and treatment plan reviewed with patient. Importance of medication and treatment compliance reviewed with patient. Encouraged participation in milieu and group therapy on the unit. Legal status: 201 voluntary commitment  Disposition: return to previous living arrangement, pending clinical stabilization         SUBJECTIVE     Over the last 24 hours:  PRN medications: None  Behavior over the last 24 hours: slowly improving.      The patient was seen and evaluated today for continuity of care. On interview, the patient is Laying in bed. Patient reports "Good" mood. Patient endorses "Tired" energy levels. Patient reports " good" sleep, from 8 PM to around 11 AM. Per patient, appetite is " good". Patient reports feeling more stable, less anxious and Improvement in ADHD. Currently, patient denies auditory hallucinations and denies visual hallucinations. The patient denies current passive or active suicidal ideation, intent, or plan. Patient denies current passive or active homicidal ideation, intent, or plan. Patient reports tolerating medications well and denies adverse effects at this time. Team discussed treatment plan, to which patient is amenable. Patient does not endorse additional questions or concerns at this time. Has been taking medications. Follows directions more appropriately. Reports attending groups and socializing with peers.      Progress Toward Goals: improving gradually, less anxious    Review of Systems  Sleep: normal  Appetite: normal  Medication side effects: No   ROS: reports tiredness, denies any dry mouth, muscle aches or sore throat, all other systems are negative      OBJECTIVE     Vital signs in last 24 hours:   Temp:  [97.4 °F (36.3 °C)-98.1 °F (36.7 °C)] 98.1 °F (36.7 °C)  HR:  [] 101  Resp:  [16-18] 16  BP: (131-146)/(65-86) 131/82    Mental Status Exam:  Appearance: alert, appears stated age, casually dressed, marginal grooming/hygiene, obese and bearded  Behavior: cooperative, calm, fair eye contact  Motor: no abnormal movements  Gait/Station: in bed, unable to assess  Speech: normal rate, normal volume, normal pitch  Mood: "Good"  Affect: constricted  Thought process: goal directed, circumstantial, normal rate of thoughts  Associations: circumstantial associations  Thought content: no overt delusions  Perceptual disturbances: no auditory hallucinations, no visual hallucinations, does not appear responding to internal stimuli  Risk potential: {Suicidal ideation - None  Homicidal ideation - None  Potential for aggression - Yes, due to history of violence  Cognition: appears to be below average intelligence and cognition not formally tested  Sensorium: oriented to person, place and time/date  Memory: recent and remote memory grossly intact  Consciousness: alert and awake  Attention/Concentration: decreased concentration and decreased attention span  Insight: Impaired   Judgment: Impaired     Suicide/Homicide Risk Assessment:  Risk of Harm to Self:   Nursing Suicide Risk Assessment Last 24 hours: C-SSRS Risk (Since Last Contact)  Calculated C-SSRS Risk Score (Since Last Contact): No Risk Indicated  Current Specific Risk Factors include: mental illness diagnosis, impaired cognition, poor reasoning, poor impulse control  Protective Factors: no current suicidal plan or intent, ability to communicate with staff on the unit, taking medications as ordered on the unit, improved anxiety symptoms, compliant with mental health treatment  Based on today's assessment, Dionte Means presents the following risk of harm to self: low  Risk of Harm to Others:  Nursing Homicide Risk Assessment: Violence Risk to Others: Yes- Within the last 6 months  Current Specific Risk Factors include: poor impulse control, behavior suggesting impulsivity, diagnosis of mood disorder, recent history of violent behavior, poor insight  Protective Factors: no current homicidal ideation, able to communicate with staff on the unit, impulse control is improving, mood is stabilizing, willing to continue psychiatric treatment  Based on today's assessment, Dionte Means presents the following risk of harm to others: low  The following interventions are recommended: behavioral checks every 7 minutes, continued hospitalization on locked unit      Nutrition Assessment and Intervention:     Reviewed food recall journal           ACTIVE MEDICATIONS     Current Medications:  Current Facility-Administered Medications Medication Dose Route Frequency Provider Last Rate   • acetaminophen  650 mg Oral Q6H PRN Deyanira Alejandre MD     • acetaminophen  650 mg Oral Q4H PRN Deyanira Alejandre MD     • acetaminophen  975 mg Oral Q6H PRN Deyanira Alejandre MD     • aluminum-magnesium hydroxide-simethicone  30 mL Oral Q4H PRN Deyanira Alejandre MD     • benztropine  1 mg Intramuscular Q4H PRN Max 6/day Deyanira Alejandre MD     • benztropine  1 mg Oral Q4H PRN Max 6/day Deyanira Alejandre MD     • busPIRone  15 mg Oral BID Deyanira Alejandre MD     • cholecalciferol  2,000 Units Oral Daily Deyanira Alejandre MD     • hydrOXYzine HCL  50 mg Oral Q6H PRN Max 4/day Deyanira Alejandre MD      Or   • diphenhydrAMINE  50 mg Intramuscular Q6H PRN Deyanira Alejandre MD     • FLUoxetine  20 mg Oral Daily Mani Vicente MD     • hydrOXYzine HCL  100 mg Oral Q6H PRN Max 4/day Deyanira Alejandre MD      Or   • LORazepam  2 mg Intramuscular Q6H PRN Deyanira Alejandre MD     • hydrOXYzine HCL  25 mg Oral Q6H PRN Max 4/day Deyanira Alejandre MD     • melatonin  3 mg Oral HS Deyanira Alejandre MD     • OLANZapine  10 mg Oral Q3H PRN Max 3/day Deyanira Alejandre MD      Or   • OLANZapine  10 mg Intramuscular Q3H PRN Max 3/day Deyanira Alejandre MD     • OLANZapine  5 mg Oral Q3H PRN Max 6/day Deyanira Alejandre MD      Or   • OLANZapine  5 mg Intramuscular Q3H PRN Max 6/day Deyanira Alejandre MD     • OLANZapine  2.5 mg Oral Q3H PRN Max 8/day Deyanira Alejandre MD     • propranolol  10 mg Oral Q8H PRN Deyanira Alejandre MD     • risperiDONE  1 mg Oral BID Mani Vicente MD     • senna-docusate sodium  1 tablet Oral Daily PRN Deyanira Alejandre MD     • traZODone  50 mg Oral HS Deyanira Alejandre MD     • traZODone  50 mg Oral HS PRN Deyanira Alejandre MD         Behavioral Health Medications: I have personally reviewed all current active medications. Changes as in plan section above.       ADDITIONAL DATA     EKG Results: reviewed  Results for orders placed or performed during the hospital encounter of 09/19/23 (from the past 1000 hour(s))   ECG 12 lead    Collection Time: 09/22/23  5:04 PM   Result Value    Ventricular Rate 86    Atrial Rate 86    MO Interval 142    QRSD Interval 96    QT Interval 348    QTC Interval 416    P Axis 36    QRS Axis 83    T Wave Axis 43    Narrative    Normal sinus rhythm  Normal ECG  No previous ECGs available  Confirmed by Nuha Maradiaga ([id number]) on 9/25/2023 8:30:08 AM     *Note: Due to a large number of results and/or encounters for the requested time period, some results have not been displayed. A complete set of results can be found in Results Review. Radiology Results: Reviewed  No results found. No Chest XR results available for this patient. Laboratory Results: I have personally reviewed all pertinent laboratory/tests results   Results from the past 24 hours: No results found for this or any previous visit (from the past 24 hour(s)).       This note was not shared with the patient due to reasonable likelihood of causing patient harm        Mulugeta Hoff MD 09/25/23  Department of Psychiatry and [facility]

## 2023-09-26 PROCEDURE — 99232 SBSQ HOSP IP/OBS MODERATE 35: CPT | Performed by: PSYCHIATRY & NEUROLOGY

## 2023-09-26 RX ORDER — FLUOXETINE 10 MG/1
10 CAPSULE ORAL DAILY
Status: COMPLETED | OUTPATIENT
Start: 2023-09-27 | End: 2023-09-27

## 2023-09-26 RX ORDER — RISPERIDONE 1 MG/1
1 TABLET, ORALLY DISINTEGRATING ORAL DAILY
Status: DISCONTINUED | OUTPATIENT
Start: 2023-09-27 | End: 2023-10-09

## 2023-09-26 RX ORDER — RISPERIDONE 2 MG/1
2 TABLET, ORALLY DISINTEGRATING ORAL
Status: DISCONTINUED | OUTPATIENT
Start: 2023-09-26 | End: 2023-09-30

## 2023-09-26 RX ADMIN — MELATONIN TAB 3 MG 3 MG: 3 TAB at 21:24

## 2023-09-26 RX ADMIN — BUSPIRONE HYDROCHLORIDE 15 MG: 10 TABLET ORAL at 17:09

## 2023-09-26 RX ADMIN — RISPERIDONE 1 MG: 1 TABLET, ORALLY DISINTEGRATING ORAL at 08:06

## 2023-09-26 RX ADMIN — CHOLECALCIFEROL TAB 25 MCG (1000 UNIT) 2000 UNITS: 25 TAB at 08:06

## 2023-09-26 RX ADMIN — RISPERIDONE 2 MG: 2 TABLET, ORALLY DISINTEGRATING ORAL at 21:24

## 2023-09-26 RX ADMIN — TRAZODONE HYDROCHLORIDE 50 MG: 50 TABLET ORAL at 21:24

## 2023-09-26 RX ADMIN — BUSPIRONE HYDROCHLORIDE 15 MG: 10 TABLET ORAL at 08:06

## 2023-09-26 RX ADMIN — FLUOXETINE 20 MG: 20 CAPSULE ORAL at 08:06

## 2023-09-26 NOTE — NURSING NOTE
Pt is visible in the milieu. He is calm, cooperative, and pleasant. Denies SI/HI/AVH. Pt reports trying to catch up on sleep. No problems observed or reported. Q 7 min checks maintained for safety.

## 2023-09-26 NOTE — NURSING NOTE
Patient is calm and cooperative upon approach. Patient is visible for meals. Patient denies SI/HI/AH/VH. Patient is compliant with meds and meals.

## 2023-09-26 NOTE — PLAN OF CARE
Problem: Ineffective Coping  Goal: Identifies healthy coping skills  Outcome: Progressing  Goal: Free from restraint events  Description: - Utilize least restrictive measures   - Provide behavioral interventions   - Redirect inappropriate behaviors   Outcome: Progressing     Problem: DISCHARGE PLANNING  Goal: Discharge to home or other facility with appropriate resources  Description: INTERVENTIONS:  - Identify barriers to discharge w/patient and caregiver  - Arrange for needed discharge resources and transportation as appropriate  - Identify discharge learning needs (meds, wound care, etc.)  - Arrange for interpretive services to assist at discharge as needed  - Refer to Case Management Department for coordinating discharge planning if the patient needs post-hospital services based on physician/advanced practitioner order or complex needs related to functional status, cognitive ability, or social support system  Outcome: Progressing     Problem: BEHAVIOR  Goal: Pt/Family maintain appropriate behavior and adhere to behavioral management agreement, if implemented  Description: INTERVENTIONS:  - Assess the family dynamic   - Encourage verbalization of thoughts and concerns in a socially appropriate manner  - Assess patient/family's coping skills and non-compliant behavior (including use of illegal substances). - Utilize positive, consistent limit setting strategies supporting safety of patient, staff and others  - Initiate consult with Case Management, Spiritual Care or other ancillary services as appropriate  - If a patient's/visitor's behavior jeopardizes the safety of the patient, staff, or others, refer to organization procedure.    - Notify Security of behavior or suspected illegal substances which indicate the need for search of the patient and/or belongings  - Encourage participation in the decision making process about a behavioral management agreement; implement if patient meets criteria  Outcome: Progressing     Problem: Risk for Violence/Aggression Toward Others  Goal: Treatment Goal: Refrain from acts of violence/aggression during length of stay, and demonstrate improved impulse control at the time of discharge  Outcome: Progressing  Goal: Refrain from harming others  Outcome: Progressing     Problem: Ineffective Coping  Goal: Participates in unit activities  Description: Interventions:  - Provide therapeutic environment   - Provide required programming   - Redirect inappropriate behaviors   Outcome: Progressing

## 2023-09-26 NOTE — PROGRESS NOTES
09/26/23 0813   Team Meeting   Meeting Type Daily Rounds   Team Members Present   Team Members Present Physician;Nurse;   Physician Team Member [id number] Team Member SYLVIA AMOR [facility] Management Team Member Daksha   Patient/Family Present   Patient Present No   Patient's Family Present No     Pt visible, social and attends some groups. No behavioral issues. Compliant with meds and meals. Continue meds -discharge Prozac in day, increase risperidone 1 mg and 2 mg and offer a mood stabilizer. Continue to monitor. Discharge to be determined.

## 2023-09-26 NOTE — CASE MANAGEMENT
Patient Intake     Age / Race / Sex / Alyce Jaime / Marital Status / Children 18, white, male, single, Disabled, lives in personal care home with other resident. Staffed 24/7. Pt is never  and reports having no children. Education / Income Source / Type of work / Rep Payee    [address], pt completed [employer]   Patient's Telephone Number   [phone]   Residence - [address] [facility])   Emergency Contacts Hayley Kent  [phone]     Can patient return home if yes to what address Yes,   [address] [facility] [place]      Discharge transportation? Group home to pick patient upon discharge   [address] / [address] [facility] [address]   Preferred Pharmacy  [facility]     Admission Status      Status of admission [address] denies having any     Patient History   Stressors / Triggers to current admission Aggressive behaviors toward group home staff. Treatment History Prior admissions, anger issues since the adolescence. Hx of IED, ASD and aggressive behaviors and anxiety. Hx of Suicide Attempts Yes, does not ellaborate   Family history of Mental illness "Unsure"   Trauma History Denies having past trauma   Eating Disorder Denied    Substance Abuse Denied    Inpatient Rehab Tx History Denies    Legal Issues Yes, on probation at [facility]. Due to simple assault and property destruction charges. Access to firearms Denied   Support system   Current Psychiatrist / Julio Amador (for [address] [facility])   ACT / ICM-BCM-TCM /     [facility] support Coordination   PCP  Kary Gomes DO   Family-friends supports  Pt has a sister, not involved.     Hobbies / Interests / Coping skills Loves playing video games, going to rae and working on cars   Goals "to get better"   Releases of Information  PCPIrvin, [address] Referrals TBD

## 2023-09-26 NOTE — PLAN OF CARE
Writer provided status update and admission notification to Arturo Hammond at [address]. Arturo Hammond confirmed that pt will return to residence when discharged. However, Group home staff,  would like to have discharge coordination meeting prior to discharge. Arturo Hammond also noted pt's meds be sent [facility] pharmacy in [facility] at least 24 hour prior to discharge. Arturo Hammond also faxed list of meds that have helped pt better. He further noted that pt must be stable appropriate med regiment. Arturo Hammond noted that pt recently moved into their facility and has not been on the right meds since released from [facility] prison in June of 2023. Pt is scheduled with [facility] for psych eval that pt missed recently due to the current admission. "I suggest Jaime to be on right meds that will help him control his anger". Referring pt's recent aggressive behavior towards their staff. Continue to coordinate.      Problem: DISCHARGE PLANNING  Goal: Discharge to home or other facility with appropriate resources  Description: INTERVENTIONS:  - Identify barriers to discharge w/patient and caregiver  - Arrange for needed discharge resources and transportation as appropriate  - Identify discharge learning needs (meds, wound care, etc.)  - Arrange for interpretive services to assist at discharge as needed  - Refer to Case Management Department for coordinating discharge planning if the patient needs post-hospital services based on physician/advanced practitioner order or complex needs related to functional status, cognitive ability, or social support system  Outcome: Progressing

## 2023-09-26 NOTE — CASE MANAGEMENT
Writer called [phone] Adan Isabella @ [address] [place] [address]. Andria Herrera briefly advised that pt has  from [place] Leon is not at work right now, a phone call is scheduled for today late morning. Write to follow up.

## 2023-09-26 NOTE — PROGRESS NOTES
BEHAVIORAL HEALTH SERVICE PROGRESS NOTE    Zaheer Cuevas 19 y.o. male MRN: [id number]  Unit/Bed#: [facility] 382-01 Encounter: [id number]         ASSESSMENT/PLAN     Zaheer Cuevas is a 19 y.o. male, with history of mood disorder and autism spectrum disorder, who was admitted to [address] on 302 involuntary commitment for agitation and aggression. Currently, will decrease Prozac to 10 mg daily and discontinue after dose tomorrow for taper. Principal Problem:    Mood disorder ([address])  Active Problems:    ADHD (attention deficit hyperactivity disorder)    Intermittent explosive disorder in adult    Autism spectrum disorder    Medical clearance for psychiatric admission    Obesity (BMI 35.0-39.9 without comorbidity)      RECOMMENDED TREATMENT     Continue buspirone 15 mg for anxiety  Decrease fluoxetine to 10 mg daily for mood, will discontinue tomorrow for taper  Continue melatonin 3 mg daily at bedtime for sleep  Increase to risperidone 1 mg daily, 2 mg daily at bedtime for psychosis/mood  Continue trazodone 50 mg daily at bedtime for sleep    All current active medications have been reviewed  Encourage group therapy, milieu therapy and occupational therapy  Behavioral Health checks every 15 minutes    Risks / Benefits of Treatment: Risks, benefits, and possible side effects of medications explained to patient and patient verbalizes understanding and agreement for treatment. Counseling / Coordination of Care: Patient's progress discussed with staff in treatment team meeting. Medications, treatment progress and treatment plan reviewed with patient. Coping skills reviewed with patient. Group attendance encouraged. Legal status: 201 voluntary commitment  Disposition: Pending placement, pending clinical stabilization         SUBJECTIVE     Over the last 24 hours:  Per nursing note: " Pt is visible in the milieu. He is calm, cooperative, and pleasant. Denies SI/HI/AVH. Pt reports trying to catch up on sleep.  No problems observed or reported. Q 7 min checks maintained for safety. "  PRN medications: none  Behavior over the last 24 hours: improving. The patient was seen and evaluated today for continuity of care. On interview, the patient is laying in bed. Patient reports "good" mood. Patient endorses "pretty good" energy levels. Patient reports "really good" sleep, for 11 hours. Per patient, appetite is "good". Patient reports doing better. Currently, patient denies auditory hallucinations and denies visual hallucinations. The patient denies current passive or active suicidal ideation, intent, or plan. Patient denies current passive or active homicidal ideation, intent, or plan. Patient reports tolerating medications well and denies adverse effects at this time. Team discussed treatment plan, to which patient is amenable. Patient does not endorse additional questions or concerns at this time. Encouraged to attend groups.     Progress Toward Goals: progressing, more cooperative , less anxious    Review of Systems:  Sleep: normal  Appetite: normal  Medication side effects: No   ROS: reports dry mouth, denies any headache, shortness of breath or dizziness, all other systems are negative      OBJECTIVE     Vital signs in last 24 hours:   Temp:  [97.4 °F (36.3 °C)] 97.4 °F (36.3 °C)  HR:  [79] 79  Resp:  [16] 16  BP: (122)/(71) 122/71    Mental Status Exam:  Appearance: alert, appears stated age, casually dressed, disheveled, obese and bearded  Behavior: cooperative, calm, somewhat pleasant  Motor: no abnormal movements  Gait/Station: normal gait/station, normal balance  Speech: normal rate, normal volume, normal pitch  Mood: "good"  Affect: constricted  Thought process: goal directed, normal rate of thoughts  Associations: intact associations  Thought content: no overt delusions  Perceptual disturbances: no auditory hallucinations, no visual hallucinations, appears distracted, does not appear responding to internal stimuli  Risk potential:   Suicidal ideation - None  Homicidal ideation - None  Potential for aggression - Yes, due to history of violent behavior  Cognition: appears to be below average intelligence, cognition not formally tested  Sensorium: oriented to person and place  Memory: recent and remote memory grossly intact  Consciousness: alert and awake  Attention/Concentration: attention span and concentration appear shorter than expected for age  Insight: Limited  Judgment: Limited     Suicide/Homicide Risk Assessment:  Risk of Harm to Self:   Nursing Suicide Risk Assessment Last 24 hours: C-SSRS Risk (Since Last Contact)  Calculated C-SSRS Risk Score (Since Last Contact): No Risk Indicated  Current Specific Risk Factors include: mental illness diagnosis, impaired cognition, poor reasoning  Protective Factors: no current suicidal ideation, ability to make plans for the future, improved mood, improved anxiety symptoms, restricted access to lethal means  Based on today's assessment, Katelin Busby presents the following risk of harm to self: low  Risk of Harm to Others:  Nursing Homicide Risk Assessment: Violence Risk to Others: Yes- Within the last 6 months  Current Specific Risk Factors include: poor impulse control, impaired cognition, diagnosis of mood disorder, recent history of violent behavior  Protective Factors: no current homicidal ideation, no current psychotic symptoms, willing to continue psychiatric treatment, restricted access to lethal means, sense of personal control  Based on today's assessment, Katelin Busby presents the following risk of harm to others: low  The following interventions are recommended: behavioral checks every 7 minutes, continued hospitalization on locked unit      Nutrition Assessment and Intervention:     Reviewed food recall journal      Emotional and Mental Well-being, Sleep, Connectedness Assessment and Intervention:    Sleep/stress assessment performed           ACTIVE MEDICATIONS Current Medications:  Current Facility-Administered Medications   Medication Dose Route Frequency Provider Last Rate   • acetaminophen  650 mg Oral Q6H PRN Cooper Ramirez MD     • acetaminophen  650 mg Oral Q4H PRN Cooper Ramirez MD     • acetaminophen  975 mg Oral Q6H PRN Cooper Ramirez MD     • aluminum-magnesium hydroxide-simethicone  30 mL Oral Q4H PRN Cooper Ramirez MD     • benztropine  1 mg Intramuscular Q4H PRN Max 6/day Cooper Ramirez MD     • benztropine  1 mg Oral Q4H PRN Max 6/day Cooper Ramirez MD     • busPIRone  15 mg Oral BID Cooper Ramirez MD     • cholecalciferol  2,000 Units Oral Daily Cooper Ramirez MD     • hydrOXYzine HCL  50 mg Oral Q6H PRN Max 4/day Cooper Ramirez MD      Or   • diphenhydrAMINE  50 mg Intramuscular Q6H PRN Cooper Ramirez MD     • [START ON 9/27/2023] FLUoxetine  10 mg Oral Daily Fallon Nesbitt MD     • hydrOXYzine HCL  100 mg Oral Q6H PRN Max 4/day Cooper Ramirez MD      Or   • LORazepam  2 mg Intramuscular Q6H PRN Cooper Ramirez MD     • hydrOXYzine HCL  25 mg Oral Q6H PRN Max 4/day Cooper Ramirez MD     • melatonin  3 mg Oral HS Cooper Ramirez MD     • OLANZapine  10 mg Oral Q3H PRN Max 3/day Cooper Ramirez MD      Or   • OLANZapine  10 mg Intramuscular Q3H PRN Max 3/day Cooper Ramirez MD     • OLANZapine  5 mg Oral Q3H PRN Max 6/day Cooper Ramirez MD      Or   • OLANZapine  5 mg Intramuscular Q3H PRN Max 6/day Cooper Ramirez MD     • OLANZapine  2.5 mg Oral Q3H PRN Max 8/day Cooper Ramirez MD     • propranolol  10 mg Oral Q8H PRN Cooper Ramirez MD     • [START ON 9/27/2023] risperiDONE  1 mg Oral Daily Fallon Nesbitt MD     • risperiDONE  2 mg Oral HS Fallon Nesbitt MD     • senna-docusate sodium  1 tablet Oral Daily PRN Cooper Ramirez MD     • traZODone  50 mg Oral HS Cooper Ramirez MD     • traZODone  50 mg Oral HS PRN Cooper Ramirez MD         Behavioral Health Medications:  I have personally reviewed all current active medications. Changes as in plan section above. ADDITIONAL DATA     EKG Results: reviewed  Results for orders placed or performed during the hospital encounter of 09/19/23 (from the past 1000 hour(s))   ECG 12 lead    Collection Time: 09/22/23  5:04 PM   Result Value    Ventricular Rate 86    Atrial Rate 86    CO Interval 142    QRSD Interval 96    QT Interval 348    QTC Interval 416    P Axis 36    QRS Axis 83    T Wave Axis 43    Narrative    Normal sinus rhythm  Normal ECG  No previous ECGs available  Confirmed by Brandy Marquis ([id number]) on 9/25/2023 8:30:08 AM     *Note: Due to a large number of results and/or encounters for the requested time period, some results have not been displayed. A complete set of results can be found in Results Review. Radiology Results: Reviewed, no new imaging  No results found. No Chest XR results available for this patient. Laboratory Results: I have personally reviewed all pertinent laboratory/tests results  Results from the past 24 hours: No results found for this or any previous visit (from the past 24 hour(s)).       This note was not shared with the patient due to reasonable likelihood of causing patient harm        Bradley Arroyo MD 09/26/23  Department of Psychiatry and [facility]

## 2023-09-27 PROCEDURE — 99232 SBSQ HOSP IP/OBS MODERATE 35: CPT | Performed by: PSYCHIATRY & NEUROLOGY

## 2023-09-27 RX ORDER — LITHIUM CARBONATE 300 MG/1
300 TABLET, FILM COATED, EXTENDED RELEASE ORAL ONCE
Status: COMPLETED | OUTPATIENT
Start: 2023-09-27 | End: 2023-09-27

## 2023-09-27 RX ORDER — LITHIUM CARBONATE 300 MG/1
300 TABLET, FILM COATED, EXTENDED RELEASE ORAL 2 TIMES DAILY
Status: DISCONTINUED | OUTPATIENT
Start: 2023-09-27 | End: 2023-09-30

## 2023-09-27 RX ADMIN — RISPERIDONE 1 MG: 1 TABLET, ORALLY DISINTEGRATING ORAL at 08:17

## 2023-09-27 RX ADMIN — FLUOXETINE 10 MG: 10 CAPSULE ORAL at 08:17

## 2023-09-27 RX ADMIN — LITHIUM CARBONATE 300 MG: 300 TABLET, EXTENDED RELEASE ORAL at 17:18

## 2023-09-27 RX ADMIN — LITHIUM CARBONATE 300 MG: 300 TABLET, EXTENDED RELEASE ORAL at 16:04

## 2023-09-27 RX ADMIN — BUSPIRONE HYDROCHLORIDE 15 MG: 10 TABLET ORAL at 17:18

## 2023-09-27 RX ADMIN — CHOLECALCIFEROL TAB 25 MCG (1000 UNIT) 2000 UNITS: 25 TAB at 08:17

## 2023-09-27 RX ADMIN — MELATONIN TAB 3 MG 3 MG: 3 TAB at 21:09

## 2023-09-27 RX ADMIN — BUSPIRONE HYDROCHLORIDE 15 MG: 10 TABLET ORAL at 08:17

## 2023-09-27 RX ADMIN — TRAZODONE HYDROCHLORIDE 50 MG: 50 TABLET ORAL at 21:09

## 2023-09-27 RX ADMIN — RISPERIDONE 2 MG: 2 TABLET, ORALLY DISINTEGRATING ORAL at 21:09

## 2023-09-27 NOTE — NURSING NOTE
Pt visible and pleasant throughout evening, bright and cooperative. Pt denies current needs and denies SI/HI/AH/VH. Pt is medication adherent and social with peers and staff. FAMILY HISTORY:  No pertinent family history in first degree relatives

## 2023-09-27 NOTE — PLAN OF CARE
Problem: Ineffective Coping  Goal: Identifies healthy coping skills  Outcome: Progressing  Goal: Free from restraint events  Description: - Utilize least restrictive measures   - Provide behavioral interventions   - Redirect inappropriate behaviors   Outcome: Progressing     Problem: BEHAVIOR  Goal: Pt/Family maintain appropriate behavior and adhere to behavioral management agreement, if implemented  Description: INTERVENTIONS:  - Assess the family dynamic   - Encourage verbalization of thoughts and concerns in a socially appropriate manner  - Assess patient/family's coping skills and non-compliant behavior (including use of illegal substances). - Utilize positive, consistent limit setting strategies supporting safety of patient, staff and others  - Initiate consult with Case Management, Spiritual Care or other ancillary services as appropriate  - If a patient's/visitor's behavior jeopardizes the safety of the patient, staff, or others, refer to organization procedure.    - Notify Security of behavior or suspected illegal substances which indicate the need for search of the patient and/or belongings  - Encourage participation in the decision making process about a behavioral management agreement; implement if patient meets criteria  Outcome: Progressing     Problem: Risk for Violence/Aggression Toward Others  Goal: Treatment Goal: Refrain from acts of violence/aggression during length of stay, and demonstrate improved impulse control at the time of discharge  Outcome: Progressing  Goal: Refrain from harming others  Outcome: Progressing     Problem: Ineffective Coping  Goal: Participates in unit activities  Description: Interventions:  - Provide therapeutic environment   - Provide required programming   - Redirect inappropriate behaviors   Outcome: Progressing

## 2023-09-27 NOTE — PROGRESS NOTES
BEHAVIORAL HEALTH SERVICE PROGRESS NOTE    Josiah Santana 19 y.o. male MRN: [id number]  Unit/Bed#: [facility] 382-01 Encounter: [id number]         ASSESSMENT/PLAN     Josiah Santana is a 19 y.o. male, with history of mood disorder and autism, who was admitted to [address] on 302 involuntary commitment for agitation and aggression. Currently, will discontinue Prozac and start Lithium 300 mg BID for mood. Principal Problem:    Mood disorder ([address])  Active Problems:    ADHD (attention deficit hyperactivity disorder)    Intermittent explosive disorder in adult    Autism spectrum disorder    Medical clearance for psychiatric admission    Obesity (BMI 35.0-39.9 without comorbidity)      RECOMMENDED TREATMENT     Discontinue Prozac 10 mg daily for mood  Start Lithium 300 mg BID for mood  Check CBC, CMP, Li level on Oswaldo 10/1  Continue buspirone 15 mg BID for anxiety  Continue melatonin 3 mg HS for sleep  Continue risperidone 1 mg daily, 2 mg HS for mood/psychosis  Continue trazodone 50 mg HS for sleep  All current active medications have been reviewed  Encourage group therapy, milieu therapy and occupational therapy  Behavioral Health checks every 15 minutes    Risks / Benefits of Treatment: Risks, benefits, and possible side effects of medications explained to patient and patient verbalizes understanding and agreement for treatment. Counseling / Coordination of Care: Patient's progress discussed with staff in treatment team meeting. Medications, treatment progress and treatment plan reviewed with patient. Legal status: 201 voluntary commitment  Disposition: return to previous living arrangement, pending clinical stabilization         SUBJECTIVE     Over the last 24 hours:  Per nursing note: " Pt visible and pleasant throughout evening, bright and cooperative. Pt denies current needs and denies SI/HI/AH/VH.  Pt is medication adherent and social with peers and staff.  "  PRN medications: none  Behavior over the last 24 hours: slowly improving. The patient was seen and evaluated today for continuity of care. On interview, the patient is sitting in chair, slightly restless. Patient reports "better and worse" mood. Patient endorses "good" energy levels. Patient reports "good" sleep for 8 hours. Per patient, appetite is "good". Patient reports being upset about having to return to group home. He reports improvement in sleep and alertness. He is easily distracted, at times laughing to himself at times and saying he thought of something funny while attempting to describe scenes from the movie Kang[place]oo. Currently, patient denies auditory hallucinations and denies visual hallucinations. The patient denies current passive or active suicidal ideation, intent, or plan. Patient denies current passive or active homicidal ideation, intent, or plan. Patient reports tolerating medications well and denies adverse effects at this time. Team discussed treatment plan, to which patient is amenable. Patient does not endorse additional questions or concerns at this time. Attends groups more often. Socializes more with peers.     Progress Toward Goals: progressing slowly, attends groups more often, less anxious, less irritable    Review of Systems:  Sleep: normal  Appetite: normal  Medication side effects: No   ROS: reports bruise from blood draw, denies any headache, chest pain, constipation, diarrhea or dizziness, all other systems are negative      OBJECTIVE     Vital signs in last 24 hours:   Temp:  [97.6 °F (36.4 °C)-97.7 °F (36.5 °C)] 97.7 °F (36.5 °C)  HR:  [85-96] 85  Resp:  [16-17] 17  BP: (106-123)/(61-71) 106/61    Mental Status Exam:  Appearance: alert, appears stated age, casually dressed, appropriate grooming and hygiene, obese, bearded and smiling  Behavior: cooperative, mildly anxious, less anxious, slightly more restless  Motor: no abnormal movements  Gait/Station: normal gait/station, normal balance  Speech: normal volume, normal pitch, slow, garbled at times  Mood: "better and worse"  Affect: slightly brighter, less constricted  Thought process: circumstantial, concrete  Associations: circumstantial associations  Thought content: no overt delusions  Perceptual disturbances: no auditory hallucinations, no visual hallucinations, appears distracted, does not appear responding to internal stimuli  Risk potential:   Suicidal ideation - None  Homicidal ideation - None  Potential for aggression - Yes  Sensorium: oriented to person and place  Cognition: recent and remote memory grossly intact  Consciousness: alert and awake  Attention/Concentration: attention span and concentration appear shorter than expected for age  Insight: Limited  Judgment: Limited     Suicide/Homicide Risk Assessment:  Risk of Harm to Self:   Nursing Suicide Risk Assessment Last 24 hours: C-SSRS Risk (Since Last Contact)  Calculated C-SSRS Risk Score (Since Last Contact): No Risk Indicated  Current Specific Risk Factors include: diagnosis of mood disorder, mental illness diagnosis, impaired cognition  Protective Factors: no current suicidal ideation, improved anxiety symptoms, responds to redirection, compliant with medications, compliant with mental health treatment  Based on today's assessment, Jodie Ordaz presents the following risk of harm to self: low  Risk of Harm to Others:  Nursing Homicide Risk Assessment: Violence Risk to Others: Yes- Within the last 6 months  Current Specific Risk Factors include: impaired cognition, social difficulties  Protective Factors: no current homicidal ideation, compliant with unit milieu, follows staff redirection  Based on today's assessment, Jodie Ordaz presents the following risk of harm to others: low  The following interventions are recommended: behavioral checks every 7 minutes, continued hospitalization on locked unit      Nutrition Assessment and Intervention:     Reviewed food recall journal      Emotional and Mental Well-being, Sleep, Connectedness Assessment and Intervention:    Sleep/stress assessment performed           ACTIVE MEDICATIONS     Current Medications:  Current Facility-Administered Medications   Medication Dose Route Frequency Provider Last Rate   • acetaminophen  650 mg Oral Q6H PRN Amber Campbell MD     • acetaminophen  650 mg Oral Q4H PRN Amber Campbell MD     • acetaminophen  975 mg Oral Q6H PRN Amber Campbell MD     • aluminum-magnesium hydroxide-simethicone  30 mL Oral Q4H PRN Amber Campbell MD     • benztropine  1 mg Intramuscular Q4H PRN Max 6/day Amber Campbell MD     • benztropine  1 mg Oral Q4H PRN Max 6/day Amber Campbell MD     • busPIRone  15 mg Oral BID Amber Campbell MD     • cholecalciferol  2,000 Units Oral Daily Amber Campbell MD     • hydrOXYzine HCL  50 mg Oral Q6H PRN Max 4/day Amber Campbell MD      Or   • diphenhydrAMINE  50 mg Intramuscular Q6H PRN Amber Campbell MD     • hydrOXYzine HCL  100 mg Oral Q6H PRN Max 4/day Amber Campbell MD      Or   • LORazepam  2 mg Intramuscular Q6H PRN Amber Campbell MD     • hydrOXYzine HCL  25 mg Oral Q6H PRN Max 4/day Amber Campbell MD     • melatonin  3 mg Oral HS Amber Campbell MD     • OLANZapine  10 mg Oral Q3H PRN Max 3/day Amber Campbell MD      Or   • OLANZapine  10 mg Intramuscular Q3H PRN Max 3/day Amber Campbell MD     • OLANZapine  5 mg Oral Q3H PRN Max 6/day Amber Campbell MD      Or   • OLANZapine  5 mg Intramuscular Q3H PRN Max 6/day Amber Campbell MD     • OLANZapine  2.5 mg Oral Q3H PRN Max 8/day Amber Campbell MD     • propranolol  10 mg Oral Q8H PRN Amber Campbell MD     • risperiDONE  1 mg Oral Daily Grace Rodriguez MD     • risperiDONE  2 mg Oral HS Grace Rodriguez MD     • senna-docusate sodium  1 tablet Oral Daily PRN Amber Campbell MD     • traZODone  50 mg Oral HS Amber Campbell MD     • traZODone  50 mg Oral HS PRN Amber Campbell MD         Behavioral Health Medications: I have personally reviewed all current active medications. Changes as in plan section above. ADDITIONAL DATA     EKG Results: reviewed  Results for orders placed or performed during the hospital encounter of 09/19/23 (from the past 1000 hour(s))   ECG 12 lead    Collection Time: 09/22/23  5:04 PM   Result Value    Ventricular Rate 86    Atrial Rate 86    PA Interval 142    QRSD Interval 96    QT Interval 348    QTC Interval 416    P Axis 36    QRS Axis 83    T Wave Axis 43    Narrative    Normal sinus rhythm  Normal ECG  No previous ECGs available  Confirmed by Elias Castle ([id number]) on 9/25/2023 8:30:08 AM     *Note: Due to a large number of results and/or encounters for the requested time period, some results have not been displayed. A complete set of results can be found in Results Review. Radiology Results: Reviewed, no new imaging  No results found. No Chest XR results available for this patient. Laboratory Results: I have personally reviewed all pertinent laboratory/tests results  Results from the past 24 hours: No results found for this or any previous visit (from the past 24 hour(s)).       This note was not shared with the patient due to reasonable likelihood of causing patient harm        Ivonne Garza MD 09/27/23  Department of Psychiatry and [facility]

## 2023-09-27 NOTE — NURSING NOTE
Patient is currently in his room, laying down. Visible in dayroom for breakfast. Patient is calm and cooperative upon approach. Patient denies SI/HI/AH/VH. Patient is compliant with meds and meals.

## 2023-09-27 NOTE — PROGRESS NOTES
09/27/23 0877   Team Meeting   Meeting Type Daily Rounds   Team Members Present   Team Members Present Physician;Nurse;   Physician Team Member [id number] Team Member SYLVIA AMOR [facility] Management Team Member Daksha   Patient/Family Present   Patient Present No   Patient's Family Present No      Pt is visible and compliant with meds. Group home faxed pt past med list with pt was stable on Depakote. Considering adding Depakote. Discharge to be determined. Continue to monitor.

## 2023-09-28 PROCEDURE — 99232 SBSQ HOSP IP/OBS MODERATE 35: CPT | Performed by: PSYCHIATRY & NEUROLOGY

## 2023-09-28 RX ADMIN — BUSPIRONE HYDROCHLORIDE 15 MG: 10 TABLET ORAL at 17:23

## 2023-09-28 RX ADMIN — RISPERIDONE 1 MG: 1 TABLET, ORALLY DISINTEGRATING ORAL at 08:09

## 2023-09-28 RX ADMIN — MELATONIN TAB 3 MG 3 MG: 3 TAB at 21:28

## 2023-09-28 RX ADMIN — CHOLECALCIFEROL TAB 25 MCG (1000 UNIT) 2000 UNITS: 25 TAB at 08:09

## 2023-09-28 RX ADMIN — TRAZODONE HYDROCHLORIDE 50 MG: 50 TABLET ORAL at 21:28

## 2023-09-28 RX ADMIN — RISPERIDONE 2 MG: 2 TABLET, ORALLY DISINTEGRATING ORAL at 21:28

## 2023-09-28 RX ADMIN — LITHIUM CARBONATE 300 MG: 300 TABLET, EXTENDED RELEASE ORAL at 08:09

## 2023-09-28 RX ADMIN — BUSPIRONE HYDROCHLORIDE 15 MG: 10 TABLET ORAL at 08:09

## 2023-09-28 RX ADMIN — LITHIUM CARBONATE 300 MG: 300 TABLET, EXTENDED RELEASE ORAL at 17:23

## 2023-09-28 NOTE — PROGRESS NOTES
09/28/23 0844   Team Meeting   Meeting Type Daily Rounds   Team Members Present   Team Members Present Physician;Nurse;   Physician Team Member [id number] Team Member SYLVIA AMOR [facility] Management Team Member Daksha   Patient/Family Present   Patient Present No   Patient's Family Present No     Pt visible, attends groups. Compliant with meds and meals. Pt is started on Lithium continue to monitor. Discharge to be determined.

## 2023-09-28 NOTE — PROGRESS NOTES
BEHAVIORAL HEALTH SERVICE PROGRESS NOTE    Veronica Sotver 19 y.o. male MRN: [id number]  Unit/Bed#: [facility] 382-01 Encounter: [id number]         ASSESSMENT/PLAN     Veronica Stover is a 19 y.o. male, with history of mood disorder and autism, who was admitted to [address] on 302 involuntary commitment for agitation and aggression. Currently, no medication adjustments, will check Lithium level on Saturday 9/30. Principal Problem:    Mood disorder ([address])  Active Problems:    ADHD (attention deficit hyperactivity disorder)    Intermittent explosive disorder in adult    Autism spectrum disorder    Medical clearance for psychiatric admission    Obesity (BMI 35.0-39.9 without comorbidity)      RECOMMENDED TREATMENT     Continue Lithium 300 mg BID for mood  Check CBC, CMP, Li Level on 9/30  Continue buspirone 15 mg BID for anxiety  Continue melatonin 3 mg HS for sleep  Continue risperidone 1 mg daily, 2 mg HS for mood/psychosis  Continue trazodone 50 mg HS for sleep  All current active medications have been reviewed  Encourage group therapy, milieu therapy and occupational therapy  Behavioral Health checks every 15 minutes    Risks / Benefits of Treatment: Risks, benefits, and possible side effects of medications explained to patient and patient verbalizes understanding and agreement for treatment. Counseling / Coordination of Care: Patient's progress discussed with staff in treatment team meeting. Medications, treatment progress and treatment plan reviewed with patient. Legal status: 201 voluntary commitment  Disposition: return to previous living arrangement, pending clinical stabilization         SUBJECTIVE     Over the last 24 hours:  Per nursing note: " Pt visible on unit and social with peers. Denies symptoms and states he is here for anger issues that have been in control while on unit. Pt states his group home told lies and he punched a wall. No anger or irritability this evening.  Calm and compliant with unit routines and care "  PRN medications: none  Behavior over the last 24 hours: unchanged. The patient was seen and evaluated today for continuity of care. On interview, the patient is laying in bed. Patient reports "good" mood. Patient endorses "good" energy levels. Patient reports "good" sleep. Per patient, appetite is "good". Patient was cooperative and calm. Currently, patient denies auditory hallucinations and denies visual hallucinations. The patient denies current passive or active suicidal ideation, intent, or plan. Patient denies current passive or active homicidal ideation, intent, or plan. Patient reports  tolerating medications well and denies adverse effects at this time. Team discussed treatment plan, to which patient is amenable. Patient does not endorse additional questions or concerns at this time. Seclusive to the room. Limited participation in the milieu.     Progress Toward Goals: progressing, improving gradually    Review of Systems:  Sleep: normal  Appetite: normal  Medication side effects: No   ROS: no complaints, denies any shortness of breath, constipation, diarrhea or dizziness, all other systems are negative      OBJECTIVE     Vital signs in last 24 hours:   Temp:  [97.9 °F (36.6 °C)-98.3 °F (36.8 °C)] 97.9 °F (36.6 °C)  HR:  [] 83  Resp:  [16-18] 18  BP: (111-112)/(60) 112/60    Mental Status Exam:  Appearance: alert, appears stated age, casually dressed, disheveled and obese  Behavior: cooperative, calm  Motor: no abnormal movements  Gait/Station: normal gait/station, normal balance  Speech: normal volume, normal pitch, slow at times  Mood: "good"  Affect: constricted, slightly brighter  Thought process: circumstantial, concrete  Associations: circumstantial associations  Thought content: no overt delusions  Perceptual disturbances: no auditory hallucinations, no visual hallucinations, appears distracted, does not appear responding to internal stimuli  Risk potential:   Suicidal ideation - None  Homicidal ideation - None  Potential for aggression - Yes per previous behavior  Cognition: memory grossly intact and appears to be below average intelligence  Sensorium: oriented to person, place and time/date  Consciousness: alert and awake  Attention/Concentration: attention span and concentration appear shorter than expected for age  Insight: Limited  Judgment: Limited     Suicide/Homicide Risk Assessment:  Risk of Harm to Self:   Nursing Suicide Risk Assessment Last 24 hours: C-SSRS Risk (Since Last Contact)  Calculated C-SSRS Risk Score (Since Last Contact): No Risk Indicated  Current Specific Risk Factors include: mental illness diagnosis, impaired cognition, poor impulse control  Protective Factors: no current suicidal ideation, able to contract for safety on the unit, taking medications as ordered on the unit, ability to manage anger well, ability to make plans for the future, improved mood, improved anxiety symptoms, responds to redirection  Based on today's assessment, Jluis Walton presents the following risk of harm to self: low  Risk of Harm to Others:  Nursing Homicide Risk Assessment: Violence Risk to Others: Yes- Within the last 6 months  Current Specific Risk Factors include: impaired cognition, behavior suggesting impulsivity, behavior suggesting loss of control, recent history of violent behavior, multiple stressors  Protective Factors: no current homicidal ideation, impulse control is improving, mood is stabilizing, compliant with medications on the unit as ordered, compliant with unit milieu, willing to continue psychiatric treatment  Based on today's assessment, Jluis Walton presents the following risk of harm to others: low  The following interventions are recommended: behavioral checks every 7 minutes, continued hospitalization on locked unit      Nutrition Assessment and Intervention:     Reviewed food recall journal      Emotional and Mental Well-being, Sleep, Connectedness Assessment and Intervention:    Sleep/stress assessment performed           ACTIVE MEDICATIONS     Current Medications:  Current Facility-Administered Medications   Medication Dose Route Frequency Provider Last Rate   • acetaminophen  650 mg Oral Q6H PRN Marlene Miramontes MD     • acetaminophen  650 mg Oral Q4H PRN Marlene Miramontes MD     • acetaminophen  975 mg Oral Q6H PRN Marlene Miramontes MD     • aluminum-magnesium hydroxide-simethicone  30 mL Oral Q4H PRN Marlene Miramontes MD     • benztropine  1 mg Intramuscular Q4H PRN Max 6/day Marlene Miramontes MD     • benztropine  1 mg Oral Q4H PRN Max 6/day Marlene Miramontes MD     • busPIRone  15 mg Oral BID Marlene Miramontes MD     • cholecalciferol  2,000 Units Oral Daily Marlene Miramontes MD     • hydrOXYzine HCL  50 mg Oral Q6H PRN Max 4/day Marlene Miramontes MD      Or   • diphenhydrAMINE  50 mg Intramuscular Q6H PRN Marlene Miramontes MD     • hydrOXYzine HCL  100 mg Oral Q6H PRN Max 4/day Marlene Miramontes MD      Or   • LORazepam  2 mg Intramuscular Q6H PRN Marlene Miramontes MD     • hydrOXYzine HCL  25 mg Oral Q6H PRN Max 4/day Marlene Miramontes MD     • lithium carbonate  300 mg Oral BID Gordon Dickinson MD     • melatonin  3 mg Oral HS Marlene Miramontes MD     • OLANZapine  10 mg Oral Q3H PRN Max 3/day Marlene Miramontes MD      Or   • OLANZapine  10 mg Intramuscular Q3H PRN Max 3/day Marlene Miramontes MD     • OLANZapine  5 mg Oral Q3H PRN Max 6/day Marlene Miramontes MD      Or   • OLANZapine  5 mg Intramuscular Q3H PRN Max 6/day Marlene Miramontes MD     • OLANZapine  2.5 mg Oral Q3H PRN Max 8/day Marlene Miramontes MD     • propranolol  10 mg Oral Q8H PRN Marlene Miramontes MD     • risperiDONE  1 mg Oral Daily Gordon Dickinson MD     • risperiDONE  2 mg Oral HS Gordon Dickinson MD     • senna-docusate sodium  1 tablet Oral Daily PRN Marlene Miramontes MD     • traZODone  50 mg Oral HS Marlene Miramontes MD     • traZODone  50 mg Oral HS PRN Kylah Mendosa Yenifer Lima MD         Behavioral Health Medications: I have personally reviewed all current active medications. Changes as in plan section above. ADDITIONAL DATA     EKG Results: reviewed  Results for orders placed or performed during the hospital encounter of 09/19/23 (from the past 1000 hour(s))   ECG 12 lead    Collection Time: 09/22/23  5:04 PM   Result Value    Ventricular Rate 86    Atrial Rate 86    RI Interval 142    QRSD Interval 96    QT Interval 348    QTC Interval 416    P Axis 36    QRS Axis 83    T Wave Axis 43    Narrative    Normal sinus rhythm  Normal ECG  No previous ECGs available  Confirmed by Kevin Ball ([id number]) on 9/25/2023 8:30:08 AM     *Note: Due to a large number of results and/or encounters for the requested time period, some results have not been displayed. A complete set of results can be found in Results Review. Radiology Results: Reviewed, no new imaging  No results found. No Chest XR results available for this patient. Laboratory Results: I have personally reviewed all pertinent laboratory/tests results  Results from the past 24 hours: No results found for this or any previous visit (from the past 24 hour(s)).       This note was not shared with the patient due to reasonable likelihood of causing patient harm        Claude Smack, MD 09/28/23  Department of Psychiatry and [facility]

## 2023-09-28 NOTE — NURSING NOTE
Patient is calm and pleasant upon approach. Patient is visible on milieu, socializing with peers. Patient denies SI/HI/AH/VH. Patient is compliant with meds and meals.

## 2023-09-28 NOTE — NURSING NOTE
Pt visible on unit and social with peers. Denies symptoms and states he is here for anger issues that have been in control while on unit. Pt states his group home told lies and he punched a wall. No anger or irritability this evening. Calm and compliant with unit routines and care. [Patient Intake Form Reviewed] : Patient intake form was reviewed

## 2023-09-29 PROCEDURE — 99232 SBSQ HOSP IP/OBS MODERATE 35: CPT | Performed by: PSYCHIATRY & NEUROLOGY

## 2023-09-29 RX ADMIN — RISPERIDONE 2 MG: 2 TABLET, ORALLY DISINTEGRATING ORAL at 21:08

## 2023-09-29 RX ADMIN — CHOLECALCIFEROL TAB 25 MCG (1000 UNIT) 2000 UNITS: 25 TAB at 08:09

## 2023-09-29 RX ADMIN — RISPERIDONE 1 MG: 1 TABLET, ORALLY DISINTEGRATING ORAL at 08:09

## 2023-09-29 RX ADMIN — BUSPIRONE HYDROCHLORIDE 15 MG: 10 TABLET ORAL at 08:09

## 2023-09-29 RX ADMIN — BUSPIRONE HYDROCHLORIDE 15 MG: 10 TABLET ORAL at 17:11

## 2023-09-29 RX ADMIN — MELATONIN TAB 3 MG 3 MG: 3 TAB at 21:08

## 2023-09-29 RX ADMIN — TRAZODONE HYDROCHLORIDE 50 MG: 50 TABLET ORAL at 21:08

## 2023-09-29 RX ADMIN — LITHIUM CARBONATE 300 MG: 300 TABLET, EXTENDED RELEASE ORAL at 17:11

## 2023-09-29 RX ADMIN — LITHIUM CARBONATE 300 MG: 300 TABLET, EXTENDED RELEASE ORAL at 08:09

## 2023-09-29 NOTE — NURSING NOTE
Pt visible and social on unit. Pleasant and bright in conversation. Denies symptoms and needs. No anger or irritablity noted. Compliant with unit routines and care. Safety checks continue.

## 2023-09-29 NOTE — PROGRESS NOTES
Progress Note - Behavioral Health     Luis Bagley 19 y.o. male MRN: [id number]   Unit/Bed#: [facility] 382-01 Encounter: [id number]    Behavior over the last 24 hours: unchanged. Justin Garcia seems somewhat irritable today. Staff reports that he was arguing about his food tray this morning and seemed agitated. He said during the session that he was frustrated with his order "I did not get anything that I ordered". He denies suicidal or homicidal thoughts. Limited participation in milieu. Has been taking medications.     Sleep: normal  Appetite: normal  Medication side effects: No   ROS: reports arm pain, denies any shortness of breath or chest pain, all other systems are negative    Mental Status Evaluation:    Appearance:  casually dressed   Behavior:  cooperative, limited eye contact   Speech:  normal rate and volume   Mood:  irritable   Affect:  constricted   Thought Process:  organized, concrete   Associations: concrete associations   Thought Content:  no overt delusions   Perceptual Disturbances: no auditory hallucinations, no visual hallucinations   Risk Potential: Suicidal ideation - None at present  Homicidal ideation - None at present  Potential for aggression - Yes, due to poor impulse control   Sensorium:  oriented to person, place and time/date   Memory:  recent and remote memory grossly intact   Consciousness:  alert and awake   Attention/Concentration: decreased concentration and decreased attention span   Insight:  limited   Judgment: limited   Gait/Station: normal gait/station, normal balance   Motor Activity: no abnormal movements     Vital signs in last 24 hours:    Temp:  [97.8 °F (36.6 °C)-98.4 °F (36.9 °C)] 97.8 °F (36.6 °C)  HR:  [] 77  Resp:  [16] 16  BP: (122-139)/(70-83) 139/83    Laboratory results: I have personally reviewed all pertinent laboratory/tests results    CBC:   Lab Results   Component Value Date    WBC 5.33 09/30/2023    RBC 5.52 09/30/2023    HGB 16.6 09/30/2023    HCT 48.1 09/30/2023    MCV 87 09/30/2023     09/30/2023    MCH 30.1 09/30/2023    MCHC 34.5 09/30/2023    RDW 11.9 09/30/2023    MPV 9.6 09/30/2023    NEUTROABS 2.12 09/30/2023   CMP:   Lab Results   Component Value Date    SODIUM 138 09/30/2023    K 3.9 09/30/2023     09/30/2023    CO2 26 09/30/2023    AGAP 9 09/30/2023    BUN 11 09/30/2023    CREATININE 0.57 (L) 09/30/2023    GLUC 109 09/30/2023    GLUF 109 (H) 09/30/2023    CALCIUM 9.4 09/30/2023    AST 24 09/30/2023    ALT 41 09/30/2023    ALKPHOS 39 09/30/2023    TP 6.8 09/30/2023    ALB 4.2 09/30/2023    TBILI 0.39 09/30/2023    EGFR 147 09/30/2023   Lithium:   Lab Results   Component Value Date    LITHIUM 0.2 (L) 09/30/2023       Suicide/Homicide Risk Assessment:    Risk of Harm to Self:   Nursing Suicide Risk Assessment Last 24 hours: C-SSRS Risk (Since Last Contact)  Calculated C-SSRS Risk Score (Since Last Contact): No Risk Indicated  Current Specific Risk Factors include: diagnosis of mood disorder, current unstable mood, poor impulse control  Protective Factors: no current suicidal ideation, ability to communicate with staff on the unit, taking medications as ordered on the unit  Based on today's assessment, Danielle Fuelling presents the following risk of harm to self: low    Risk of Harm to Others:  Nursing Homicide Risk Assessment: Violence Risk to Others: Yes- Within the last 6 months  Based on today's assessment, Danielle Fuelling presents the following risk of harm to others: low    The following interventions are recommended: behavioral checks every 7 minutes, continued hospitalization on locked unit    Progress Toward Goals: no significant improvement today, somewhat irritable, working on coping skills, denies suicidal thoughts, denies current homicidal thoughts    Assessment/Plan   Principal Problem:    Mood disorder (HCC)  Active Problems:    Intermittent explosive disorder in adult    Autism spectrum disorder    ADHD (attention deficit hyperactivity disorder) Medical clearance for psychiatric admission    Obesity (BMI 35.0-39.9 without comorbidity)      Recommended Treatment:     Planned medication and treatment changes: All current active medications have been reviewed  Encourage group therapy, milieu therapy and occupational therapy  Behavioral Health checks every 7 minutes  Increase Risperdal to 1 mg daily and 3 mg at bedtime to help with agitation and mood   Increase Lithium CR to 600 mg bid for mood stabilization.  Lithium level is 0.2  Recheck Lithium level and BMP on 10/3/2023     Continue all other medications:    Current Facility-Administered Medications   Medication Dose Route Frequency Provider Last Rate   • acetaminophen  650 mg Oral Q6H PRN Myriam Gastelum MD     • acetaminophen  650 mg Oral Q4H PRN Myriam Gastelum MD     • acetaminophen  975 mg Oral Q6H PRN Myriam Gastelum MD     • aluminum-magnesium hydroxide-simethicone  30 mL Oral Q4H PRN Myriam Gastelum MD     • benztropine  1 mg Intramuscular Q4H PRN Max 6/day Myriam Gastelum MD     • benztropine  1 mg Oral Q4H PRN Max 6/day Myriam Gastelum MD     • busPIRone  15 mg Oral BID Myriam Gastelum MD     • cholecalciferol  2,000 Units Oral Daily Myriam Gastelum MD     • hydrOXYzine HCL  50 mg Oral Q6H PRN Max 4/day Myriam Gastelum MD      Or   • diphenhydrAMINE  50 mg Intramuscular Q6H PRN Myriam Gastelum MD     • hydrOXYzine HCL  100 mg Oral Q6H PRN Max 4/day Myriam Gastelum MD      Or   • LORazepam  2 mg Intramuscular Q6H PRN Myriam Gastelum MD     • hydrOXYzine HCL  25 mg Oral Q6H PRN Max 4/day Myriam Gastelum MD     • lithium carbonate  600 mg Oral BID Myriam Gastelum MD     • melatonin  3 mg Oral HS Myriam Gastelum MD     • OLANZapine  10 mg Oral Q3H PRN Max 3/day Myriam Gastelum MD      Or   • OLANZapine  10 mg Intramuscular Q3H PRN Max 3/day Myriam Gastelum MD     • OLANZapine  5 mg Oral Q3H PRN Max 6/day Myriam Gastelum MD      Or   • OLANZapine 5 mg Intramuscular Q3H PRN Max 6/day Efraín Gabriel MD     • OLANZapine  2.5 mg Oral Q3H PRN Max 8/day Efraín Gabriel MD     • propranolol  10 mg Oral Q8H PRN Efraín Gabriel MD     • risperiDONE  1 mg Oral Daily Caroline Zarco MD     • risperiDONE  3 mg Oral HS Efrían Gabriel MD     • senna-docusate sodium  1 tablet Oral Daily PRN Efraín Gabriel MD     • traZODone  50 mg Oral HS Efraín Gabriel MD     • traZODone  50 mg Oral HS PRN Efraín Gabriel MD       Risks / Benefits of Treatment:    Risks, benefits, and possible side effects of medications explained to patient. Patient has limited understanding of risks and benefits of treatment at this time, but agrees to take medications as prescribed. Counseling / Coordination of Care:    Patient's progress discussed with staff in treatment team meeting. Medications, treatment progress and treatment plan reviewed with patient. Medication changes discussed with patient.     Efraín Gabriel MD 09/30/23

## 2023-09-29 NOTE — PROGRESS NOTES
09/29/23 0848   Team Meeting   Meeting Type Daily Rounds   Team Members Present   Team Members Present Physician;Nurse;   Physician Team Member [facility]   Nursing Team Member [facility] Management Team Member Daksha   Patient/Family Present   Patient Present No   Patient's Family Present No      Pt denies symptoms. Pt visible, social and attends some groups. Compliant with meds and meals. Compliant with meds and meals. Continue tartrate Lithium, lvl due Saturday. Continue to monitor.

## 2023-09-29 NOTE — PROGRESS NOTES
BEHAVIORAL HEALTH SERVICE PROGRESS NOTE    Jose Triplett 19 y.o. male MRN: [id number]  Unit/Bed#: [facility] 382-01 Encounter: [id number]         ASSESSMENT/PLAN     Jose Triplett is a 19 y.o. male, who is admitted to [address] on [address] voluntary commitment for agitation and aggression. Currently, will check lithium level, CMP, CBC on Saturday 9/30 and adjust lithium dose if level less than 0.5. Principal Problem:    Mood disorder ([address])  Active Problems:    ADHD (attention deficit hyperactivity disorder)    Intermittent explosive disorder in adult    Autism spectrum disorder    Medical clearance for psychiatric admission    Obesity (BMI 35.0-39.9 without comorbidity)      RECOMMENDED TREATMENT     Continue lithium 300 mg twice daily for mood  Check CBC, CMP, lithium level on Saturday 9/30, increase Lithium dose if level <0.5  Recheck Li level on Monday 10/2  Continue buspirone 15 mg twice daily for anxiety  Continue melatonin 3 mg at bedtime for sleep  Continue risperidone 1 mg daily, 2 mg at bedtime for mood/psychosis  Continue trazodone 50 mg at bedtime for sleep  All current active medications have been reviewed  Encourage group therapy, milieu therapy and occupational therapy  Behavioral Health checks every 15 minutes    Risks / Benefits of Treatment: Risks, benefits, and possible side effects of medications explained to patient and patient verbalizes understanding and agreement for treatment. Counseling / Coordination of Care: Patient's progress discussed with staff in treatment team meeting. Medications, treatment progress and treatment plan reviewed with patient. Legal status: 201 voluntary commitment  Disposition: return to previous living arrangement, pending stablization         SUBJECTIVE     Over the last 24 hours:  Per nursing note: " Pt visible and social on unit. Pleasant and bright in conversation. Denies symptoms and needs. No anger or irritablity noted. Compliant with unit routines and care.  Safety checks continue. "  PRN medications: None  Behavior over the last 24 hours: slowly improving. The patient was seen and evaluated today for continuity of care. On interview, the patient is Laying in bed. Patient reports "Good" mood. Patient endorses "Good" energy levels. Patient reports "Good" sleep. Per patient, appetite is "Good". Patient Appears brighter and is smiling at times. Currently, patient denies auditory hallucinations and denies visual hallucinations. The patient denies current passive or active suicidal ideation, intent, or plan. Patient denies current passive or active homicidal ideation, intent, or plan. Patient reports  tolerating medications well and denies adverse effects at this time. Team discussed treatment plan, to which patient is amenable. Patient does not endorse additional questions or concerns at this time. Participates more in milieu. Participates appropriately in group therapy.     Progress Toward Goals: progressing, more cooperative    Review of Systems:  Sleep: normal  Appetite: normal  Medication side effects: No   ROS: reports no complaints, denies any headache, shortness of breath or chest pain, all other systems are negative      OBJECTIVE     Vital signs in last 24 hours:   Temp:  [97.5 °F (36.4 °C)-97.6 °F (36.4 °C)] 97.5 °F (36.4 °C)  HR:  [89-94] 89  Resp:  [18] 18  BP: (106-123)/(67-80) 123/80    Mental Status Exam:  Appearance: alert, appears stated age, casually dressed and disheveled  Behavior: cooperative, calm, somewhat pleasant  Motor: no abnormal movements  Gait/Station: in bed  Speech: normal rate, normal volume, normal pitch  Mood: "Good"  Affect: brighter, less constricted  Thought process:  Less circumstantial  Associations: concrete associations  Thought content: no overt delusions  Perceptual disturbances: no auditory hallucinations, no visual hallucinations, appears distracted, does not appear responding to internal stimuli  Risk potential:   Suicidal ideation - None  Homicidal ideation - None  Potential for aggression - Yes Due to previous behavior  Cognition: memory grossly intact and appears to be below average intelligence  Sensorium: oriented to person and place  Consciousness: alert and awake  Attention/Concentration: attention span and concentration appear shorter than expected for age  Insight: Limited  Judgment: Limited     Suicide/Homicide Risk Assessment:  Risk of Harm to Self:   Nursing Suicide Risk Assessment Last 24 hours: C-SSRS Risk (Since Last Contact)  Calculated C-SSRS Risk Score (Since Last Contact): No Risk Indicated  Current Specific Risk Factors include: mental illness diagnosis, impaired cognition, poor impulse control  Protective Factors: Protective Factors:  The patient has desire to live, The patient does not want to die, no current suicidal ideation, taking medications as ordered on the unit  Based on today's assessment, Swapna Sheppard presents the following risk of harm to self: low  Risk of Harm to Others:  Nursing Homicide Risk Assessment: Violence Risk to Others: Yes- Within the last 6 months  Current Specific Risk Factors include: behavior suggesting impulsivity, multiple stressors, social difficulties  Protective Factors: no current homicidal ideation, able to communicate with staff on the unit, impulse control is improving, improved mood, no current psychotic symptoms  Based on today's assessment, Swapna Sheppard presents the following risk of harm to others: low  The following interventions are recommended: behavioral checks every 7 minutes, continued hospitalization on locked unit      Nutrition Assessment and Intervention:     Reviewed food recall journal      Emotional and Mental Well-being, Sleep, Connectedness Assessment and Intervention:    Sleep/stress assessment performed           ACTIVE MEDICATIONS     Current Medications:  Current Facility-Administered Medications   Medication Dose Route Frequency Provider Last Rate    acetaminophen 650 mg Oral Q6H PRN Jefferson Faster, MD      acetaminophen  650 mg Oral Q4H PRN Jefferson Faster, MD      acetaminophen  975 mg Oral Q6H PRN Jefferson Faster, MD      aluminum-magnesium hydroxide-simethicone  30 mL Oral Q4H PRN Jefferson Faster, MD      benztropine  1 mg Intramuscular Q4H PRN Max 6/day Jefferson Faster, MD      benztropine  1 mg Oral Q4H PRN Max 6/day Jefferson Faster, MD      busPIRone  15 mg Oral BID Jefferson Faster, MD      cholecalciferol  2,000 Units Oral Daily Jefferson Faster, MD      hydrOXYzine HCL  50 mg Oral Q6H PRN Max 4/day Jefferson Faster, MD      Or    diphenhydrAMINE  50 mg Intramuscular Q6H PRN Jefferson Faster, MD      hydrOXYzine HCL  100 mg Oral Q6H PRN Max 4/day Jefferson Faster, MD      Or    LORazepam  2 mg Intramuscular Q6H PRN Jefferson Faster, MD      hydrOXYzine HCL  25 mg Oral Q6H PRN Max 4/day Jefferson Faster, MD      lithium carbonate  300 mg Oral BID Bradley Arroyo MD      melatonin  3 mg Oral HS Jefferson Faster, MD      OLANZapine  10 mg Oral Q3H PRN Max 3/day Jefferson Faster, MD      Or    OLANZapine  10 mg Intramuscular Q3H PRN Max 3/day Jefferson Faster, MD      OLANZapine  5 mg Oral Q3H PRN Max 6/day Jefferson Faster, MD      Or    OLANZapine  5 mg Intramuscular Q3H PRN Max 6/day Jefferson Faster, MD      OLANZapine  2.5 mg Oral Q3H PRN Max 8/day Jefferson Faster, MD      propranolol  10 mg Oral Q8H PRN Jefferson Faster, MD      risperiDONE  1 mg Oral Daily Bradley Arroyo MD      risperiDONE  2 mg Oral HS Bradley Arroyo MD      senna-docusate sodium  1 tablet Oral Daily PRN Jefferson Faster, MD      traZODone  50 mg Oral HS Jefferson Faster, MD      traZODone  50 mg Oral HS PRN Jefferson Faster, MD         Behavioral Health Medications: I have personally reviewed all current active medications. Changes as in plan section above.       ADDITIONAL DATA     EKG Results: reviewed  Results for orders placed or performed during the hospital encounter of 09/19/23 (from the past 1000 hour(s))   ECG 12 lead    Collection Time: 09/22/23  5:04 PM   Result Value    Ventricular Rate 86    Atrial Rate 86    IN Interval 142    QRSD Interval 96    QT Interval 348    QTC Interval 416    P Axis 36    QRS Axis 83    T Wave Axis 43    Narrative    Normal sinus rhythm  Normal ECG  No previous ECGs available  Confirmed by Sahra Leon ([id number]) on 9/25/2023 8:30:08 AM     *Note: Due to a large number of results and/or encounters for the requested time period, some results have not been displayed. A complete set of results can be found in Results Review. Radiology Results: Reviewed, no new imaging  No results found. No Chest XR results available for this patient. Laboratory Results: I have personally reviewed all pertinent laboratory/tests results  Results from the past 24 hours: No results found for this or any previous visit (from the past 24 hour(s)).       This note was not shared with the patient due to reasonable likelihood of causing patient harm        Earnest Simms MD 09/29/23  Department of Psychiatry and [facility]

## 2023-09-29 NOTE — CASE MANAGEMENT
Writer called patient's Group home staff Jennifer Townsend and provided status update. Advised on pt being started on Lithium and being monitored. Martha Russo will continue to engage discharge planning will be coordinated with the group home. Continue to monitor.

## 2023-09-29 NOTE — PLAN OF CARE
Pt was seen attending and participating in the group. Encouraged pt's engagement in groups. Pt denied Si, Hi and AVH. Continue to monitor.      Problem: DISCHARGE PLANNING  Goal: Discharge to home or other facility with appropriate resources  Description: INTERVENTIONS:  - Identify barriers to discharge w/patient and caregiver  - Arrange for needed discharge resources and transportation as appropriate  - Identify discharge learning needs (meds, wound care, etc.)  - Arrange for interpretive services to assist at discharge as needed  - Refer to Case Management Department for coordinating discharge planning if the patient needs post-hospital services based on physician/advanced practitioner order or complex needs related to functional status, cognitive ability, or social support system  Outcome: Progressing

## 2023-09-29 NOTE — NURSING NOTE
Denies SI/HI/SIB/AVH/Pain. Calm and in control. Med compliant. Visible on unit. Wearing own clothes. Offers no complaints. Socializing with staff and peers.

## 2023-09-30 LAB
ALBUMIN SERPL BCP-MCNC: 4.2 G/DL (ref 3.5–5)
ALP SERPL-CCNC: 39 U/L (ref 34–104)
ALT SERPL W P-5'-P-CCNC: 41 U/L (ref 7–52)
ANION GAP SERPL CALCULATED.3IONS-SCNC: 9 MMOL/L
AST SERPL W P-5'-P-CCNC: 24 U/L (ref 13–39)
BASOPHILS # BLD AUTO: 0.03 THOUSANDS/ÂΜL (ref 0–0.1)
BASOPHILS NFR BLD AUTO: 1 % (ref 0–1)
BILIRUB SERPL-MCNC: 0.39 MG/DL (ref 0.2–1)
BUN SERPL-MCNC: 11 MG/DL (ref 5–25)
CALCIUM SERPL-MCNC: 9.4 MG/DL (ref 8.4–10.2)
CHLORIDE SERPL-SCNC: 103 MMOL/L (ref 96–108)
CO2 SERPL-SCNC: 26 MMOL/L (ref 21–32)
CREAT SERPL-MCNC: 0.57 MG/DL (ref 0.6–1.3)
EOSINOPHIL # BLD AUTO: 0.28 THOUSAND/ÂΜL (ref 0–0.61)
EOSINOPHIL NFR BLD AUTO: 5 % (ref 0–6)
ERYTHROCYTE [DISTWIDTH] IN BLOOD BY AUTOMATED COUNT: 11.9 % (ref 11.6–15.1)
GFR SERPL CREATININE-BSD FRML MDRD: 147 ML/MIN/1.73SQ M
GLUCOSE P FAST SERPL-MCNC: 109 MG/DL (ref 65–99)
GLUCOSE SERPL-MCNC: 109 MG/DL (ref 65–140)
HCT VFR BLD AUTO: 48.1 % (ref 36.5–49.3)
HGB BLD-MCNC: 16.6 G/DL (ref 12–17)
IMM GRANULOCYTES # BLD AUTO: 0.01 THOUSAND/UL (ref 0–0.2)
IMM GRANULOCYTES NFR BLD AUTO: 0 % (ref 0–2)
LITHIUM SERPL-SCNC: 0.2 MMOL/L (ref 0.6–1.2)
LYMPHOCYTES # BLD AUTO: 2.38 THOUSANDS/ÂΜL (ref 0.6–4.47)
LYMPHOCYTES NFR BLD AUTO: 44 % (ref 14–44)
MCH RBC QN AUTO: 30.1 PG (ref 26.8–34.3)
MCHC RBC AUTO-ENTMCNC: 34.5 G/DL (ref 31.4–37.4)
MCV RBC AUTO: 87 FL (ref 82–98)
MONOCYTES # BLD AUTO: 0.51 THOUSAND/ÂΜL (ref 0.17–1.22)
MONOCYTES NFR BLD AUTO: 10 % (ref 4–12)
NEUTROPHILS # BLD AUTO: 2.12 THOUSANDS/ÂΜL (ref 1.85–7.62)
NEUTS SEG NFR BLD AUTO: 40 % (ref 43–75)
NRBC BLD AUTO-RTO: 0 /100 WBCS
PLATELET # BLD AUTO: 266 THOUSANDS/UL (ref 149–390)
PMV BLD AUTO: 9.6 FL (ref 8.9–12.7)
POTASSIUM SERPL-SCNC: 3.9 MMOL/L (ref 3.5–5.3)
PROT SERPL-MCNC: 6.8 G/DL (ref 6.4–8.4)
RBC # BLD AUTO: 5.52 MILLION/UL (ref 3.88–5.62)
SODIUM SERPL-SCNC: 138 MMOL/L (ref 135–147)
WBC # BLD AUTO: 5.33 THOUSAND/UL (ref 4.31–10.16)

## 2023-09-30 PROCEDURE — 80053 COMPREHEN METABOLIC PANEL: CPT | Performed by: PSYCHIATRY & NEUROLOGY

## 2023-09-30 PROCEDURE — 80178 ASSAY OF LITHIUM: CPT | Performed by: PSYCHIATRY & NEUROLOGY

## 2023-09-30 PROCEDURE — 85025 COMPLETE CBC W/AUTO DIFF WBC: CPT | Performed by: PSYCHIATRY & NEUROLOGY

## 2023-09-30 PROCEDURE — 99232 SBSQ HOSP IP/OBS MODERATE 35: CPT | Performed by: PSYCHIATRY & NEUROLOGY

## 2023-09-30 RX ORDER — RISPERIDONE 3 MG/1
3 TABLET, ORALLY DISINTEGRATING ORAL
Status: DISCONTINUED | OUTPATIENT
Start: 2023-09-30 | End: 2023-10-09

## 2023-09-30 RX ORDER — LITHIUM CARBONATE 300 MG/1
600 TABLET, FILM COATED, EXTENDED RELEASE ORAL 2 TIMES DAILY
Status: DISCONTINUED | OUTPATIENT
Start: 2023-09-30 | End: 2023-10-05

## 2023-09-30 RX ORDER — LITHIUM CARBONATE 300 MG/1
300 TABLET, FILM COATED, EXTENDED RELEASE ORAL ONCE
Status: COMPLETED | OUTPATIENT
Start: 2023-09-30 | End: 2023-09-30

## 2023-09-30 RX ADMIN — BUSPIRONE HYDROCHLORIDE 15 MG: 10 TABLET ORAL at 08:14

## 2023-09-30 RX ADMIN — LITHIUM CARBONATE 600 MG: 300 TABLET, EXTENDED RELEASE ORAL at 17:04

## 2023-09-30 RX ADMIN — TRAZODONE HYDROCHLORIDE 50 MG: 50 TABLET ORAL at 21:19

## 2023-09-30 RX ADMIN — CHOLECALCIFEROL TAB 25 MCG (1000 UNIT) 2000 UNITS: 25 TAB at 08:14

## 2023-09-30 RX ADMIN — RISPERIDONE 3 MG: 3 TABLET, ORALLY DISINTEGRATING ORAL at 21:19

## 2023-09-30 RX ADMIN — BUSPIRONE HYDROCHLORIDE 15 MG: 10 TABLET ORAL at 17:04

## 2023-09-30 RX ADMIN — MELATONIN TAB 3 MG 3 MG: 3 TAB at 21:19

## 2023-09-30 RX ADMIN — LITHIUM CARBONATE 300 MG: 300 TABLET, EXTENDED RELEASE ORAL at 08:14

## 2023-09-30 RX ADMIN — LITHIUM CARBONATE 300 MG: 300 TABLET, EXTENDED RELEASE ORAL at 10:30

## 2023-09-30 RX ADMIN — RISPERIDONE 1 MG: 1 TABLET, ORALLY DISINTEGRATING ORAL at 08:14

## 2023-09-30 NOTE — NURSING NOTE
Denies SI/HI/SIB/AVH/Pain. Requires much emotional support this AM. Pt expressing exasperation with admission. Want to return home. Has remained med compliant. Visible on unit. Wearing own clothes. Offers no complaints. Socializing with staff and peers.

## 2023-10-01 PROCEDURE — 99232 SBSQ HOSP IP/OBS MODERATE 35: CPT | Performed by: STUDENT IN AN ORGANIZED HEALTH CARE EDUCATION/TRAINING PROGRAM

## 2023-10-01 RX ADMIN — CHOLECALCIFEROL TAB 25 MCG (1000 UNIT) 2000 UNITS: 25 TAB at 08:42

## 2023-10-01 RX ADMIN — MELATONIN TAB 3 MG 3 MG: 3 TAB at 21:09

## 2023-10-01 RX ADMIN — BUSPIRONE HYDROCHLORIDE 15 MG: 10 TABLET ORAL at 17:09

## 2023-10-01 RX ADMIN — LITHIUM CARBONATE 600 MG: 300 TABLET, EXTENDED RELEASE ORAL at 17:09

## 2023-10-01 RX ADMIN — TRAZODONE HYDROCHLORIDE 50 MG: 50 TABLET ORAL at 21:08

## 2023-10-01 RX ADMIN — LITHIUM CARBONATE 600 MG: 300 TABLET, EXTENDED RELEASE ORAL at 08:42

## 2023-10-01 RX ADMIN — RISPERIDONE 1 MG: 1 TABLET, ORALLY DISINTEGRATING ORAL at 08:42

## 2023-10-01 RX ADMIN — BUSPIRONE HYDROCHLORIDE 15 MG: 10 TABLET ORAL at 08:42

## 2023-10-01 RX ADMIN — RISPERIDONE 3 MG: 3 TABLET, ORALLY DISINTEGRATING ORAL at 21:09

## 2023-10-01 NOTE — NURSING NOTE
Pt is pleasant and cooperative. Pt is med compliant. Pt can be seen in bedroom. Pt denies depression and anxiety. Pt denies SI/HI and AH/VH. No unmet needs reported. Will continue to monitor.

## 2023-10-01 NOTE — PLAN OF CARE
Problem: Ineffective Coping  Goal: Identifies healthy coping skills  Outcome: Progressing  Goal: Free from restraint events  Description: - Utilize least restrictive measures   - Provide behavioral interventions   - Redirect inappropriate behaviors   Outcome: Progressing     Problem: Risk for Violence/Aggression Toward Others  Goal: Treatment Goal: Refrain from acts of violence/aggression during length of stay, and demonstrate improved impulse control at the time of discharge  Outcome: Progressing  Goal: Refrain from harming others  Outcome: Progressing     Problem: Ineffective Coping  Goal: Participates in unit activities  Description: Interventions:  - Provide therapeutic environment   - Provide required programming   - Redirect inappropriate behaviors   Outcome: Not Progressing     Problem: BEHAVIOR  Goal: Pt/Family maintain appropriate behavior and adhere to behavioral management agreement, if implemented  Description: INTERVENTIONS:  - Assess the family dynamic   - Encourage verbalization of thoughts and concerns in a socially appropriate manner  - Assess patient/family's coping skills and non-compliant behavior (including use of illegal substances). - Utilize positive, consistent limit setting strategies supporting safety of patient, staff and others  - Initiate consult with Case Management, Spiritual Care or other ancillary services as appropriate  - If a patient's/visitor's behavior jeopardizes the safety of the patient, staff, or others, refer to organization procedure.    - Notify Security of behavior or suspected illegal substances which indicate the need for search of the patient and/or belongings  - Encourage participation in the decision making process about a behavioral management agreement; implement if patient meets criteria  Outcome: Progressing

## 2023-10-01 NOTE — PROGRESS NOTES
Progress Note - Behavioral Health   Zaheer Cuevas 19 y.o. male MRN: [id number]  Unit/Bed#: [facility] 382-01 Encounter: [id number]    Assessment/Plan   Principal Problem:    Mood disorder ([address])  Active Problems:    ADHD (attention deficit hyperactivity disorder)    Intermittent explosive disorder in adult    Autism spectrum disorder    Medical clearance for psychiatric admission    Obesity (BMI 35.0-39.9 without comorbidity)    Recommended Treatment:   Continue medications at current doses as below. Encourage group therapy, milieu therapy and occupational therapy  All current active medications have been reviewed  Encourage group therapy, milieu therapy and occupational therapy  Behavioral Health checks every 7 minutes    ----------------------------------------      Subjective:   Per nursing report, patient has been doing well on the unit. He has been calm and cooperative with staff. He remains medication compliant. He has been without any acute behavioral issues. Patient reports feeling well today. He endorsed that his mood is good. He notes that his sleep and appetite have been good as well. He denies any problems tolerating his medications. He is hopeful for discharge soon. He denies any SI, HI, or AVH. He denies questions or concerns at this time.     Behavior over the last 24 hours:  unchanged  Sleep: normal  Appetite: normal  Medication side effects: No  ROS: no complaints and all other systems are negative    Mental Status Evaluation:  Appearance:  dressed appropriately and adequately groomed   Behavior:  calm, pleasant and cooperative   Speech:  normal rate and volume   Mood:  "Good"   Affect:  normal range and intensity, appropriate   Thought Process:  linear and goal directed   Associations: concrete associations   Thought Content:  no overt delusions   Perceptual Disturbances: denies auditory or visual hallucinations when asked, does not appear responding to internal stimuli   Risk Potential: Suicidal ideation - None  Homicidal ideation - None  Potential for aggression - Not at present   Sensorium:  oriented to person, place and time/date   Memory:  recent and remote memory grossly intact   Consciousness:  alert and awake   Attention/Concentration: attention span and concentration are age appropriate   Insight:  limited   Judgment: limited   Gait/Station: normal gait/station, normal balance   Motor Activity: no abnormal movements     Medications: all current active meds have been reviewed and continue current psychiatric medications.   Current Facility-Administered Medications   Medication Dose Route Frequency Provider Last Rate   • acetaminophen  650 mg Oral Q6H PRN Myriam Gastelum MD     • acetaminophen  650 mg Oral Q4H PRN Myriam Gastelum MD     • acetaminophen  975 mg Oral Q6H PRN Myriam Gastelum MD     • aluminum-magnesium hydroxide-simethicone  30 mL Oral Q4H PRN Myriam Gastelum MD     • benztropine  1 mg Intramuscular Q4H PRN Max 6/day Myriam Gastelum MD     • benztropine  1 mg Oral Q4H PRN Max 6/day Myriam Gastelum MD     • busPIRone  15 mg Oral BID Myriam Gastelum MD     • cholecalciferol  2,000 Units Oral Daily Myriam Gatselum MD     • hydrOXYzine HCL  50 mg Oral Q6H PRN Max 4/day Myriam Gastelum MD      Or   • diphenhydrAMINE  50 mg Intramuscular Q6H PRN Myriam Gastelum MD     • hydrOXYzine HCL  100 mg Oral Q6H PRN Max 4/day Myriam Gastelum MD      Or   • LORazepam  2 mg Intramuscular Q6H PRN Myriam Gastelum MD     • hydrOXYzine HCL  25 mg Oral Q6H PRN Max 4/day Myriam Gastelum MD     • lithium carbonate  600 mg Oral BID Myriam Gastelum MD     • melatonin  3 mg Oral HS Myriam Gastelum MD     • OLANZapine  10 mg Oral Q3H PRN Max 3/day Myriam Gastelum MD      Or   • OLANZapine  10 mg Intramuscular Q3H PRN Max 3/day Myriam Gastelum MD     • OLANZapine  5 mg Oral Q3H PRN Max 6/day Myriam Gastelum MD      Or   • OLANZapine  5 mg Intramuscular Q3H PRN Max 6/day Marlene Miramontes MD     • OLANZapine  2.5 mg Oral Q3H PRN Max 8/day Marlene Miramontes MD     • propranolol  10 mg Oral Q8H PRN Marlene Miramontes MD     • risperiDONE  1 mg Oral Daily Gordon Dickinson MD     • risperiDONE  3 mg Oral HS Marlene Miramontes MD     • senna-docusate sodium  1 tablet Oral Daily PRN Marlene Miramontes MD     • traZODone  50 mg Oral HS Marlene Miramontes MD     • traZODone  50 mg Oral HS PRN Marlene Miramontes MD         Labs: I have personally reviewed all pertinent laboratory/tests results  Most Recent Labs:   Lab Results   Component Value Date    WBC 5.33 09/30/2023    RBC 5.52 09/30/2023    HGB 16.6 09/30/2023    HCT 48.1 09/30/2023     09/30/2023    RDW 11.9 09/30/2023    NEUTROABS 2.12 09/30/2023    SODIUM 138 09/30/2023    K 3.9 09/30/2023     09/30/2023    CO2 26 09/30/2023    BUN 11 09/30/2023    CREATININE 0.57 (L) 09/30/2023    GLUC 109 09/30/2023    CALCIUM 9.4 09/30/2023    AST 24 09/30/2023    ALT 41 09/30/2023    ALKPHOS 39 09/30/2023    TP 6.8 09/30/2023    ALB 4.2 09/30/2023    TBILI 0.39 09/30/2023    CHOLESTEROL 164 09/23/2023    HDL 42 09/23/2023    TRIG 120 09/23/2023    LDLCALC 98 09/23/2023    NONHDLC 122 09/23/2023    LITHIUM 0.2 (L) 09/30/2023    [id number] 0.766 09/23/2023    SYPHILISAB Non-reactive 09/23/2023    HGBA1C 5.3 09/23/2023     09/23/2023       Progress Toward Goals: progressing    Risks / Benefits of Treatment:    Risks, benefits, and possible side effects of medications explained to patient and patient verbalizes understanding and agreement for treatment. Counseling / Coordination of Care:    Patient's progress discussed with staff in treatment team meeting. Medications, treatment progress and treatment plan reviewed with patient.     Mabeline Cargo, MD 10/01/23

## 2023-10-02 PROCEDURE — 99232 SBSQ HOSP IP/OBS MODERATE 35: CPT | Performed by: PSYCHIATRY & NEUROLOGY

## 2023-10-02 RX ADMIN — BUSPIRONE HYDROCHLORIDE 15 MG: 10 TABLET ORAL at 08:38

## 2023-10-02 RX ADMIN — TRAZODONE HYDROCHLORIDE 50 MG: 50 TABLET ORAL at 21:05

## 2023-10-02 RX ADMIN — BUSPIRONE HYDROCHLORIDE 15 MG: 10 TABLET ORAL at 17:32

## 2023-10-02 RX ADMIN — LITHIUM CARBONATE 600 MG: 300 TABLET, EXTENDED RELEASE ORAL at 08:38

## 2023-10-02 RX ADMIN — LITHIUM CARBONATE 600 MG: 300 TABLET, EXTENDED RELEASE ORAL at 17:32

## 2023-10-02 RX ADMIN — RISPERIDONE 1 MG: 1 TABLET, ORALLY DISINTEGRATING ORAL at 08:38

## 2023-10-02 RX ADMIN — CHOLECALCIFEROL TAB 25 MCG (1000 UNIT) 2000 UNITS: 25 TAB at 08:37

## 2023-10-02 RX ADMIN — RISPERIDONE 3 MG: 3 TABLET, ORALLY DISINTEGRATING ORAL at 21:05

## 2023-10-02 RX ADMIN — MELATONIN TAB 3 MG 3 MG: 3 TAB at 21:05

## 2023-10-02 NOTE — PROGRESS NOTES
BEHAVIORAL HEALTH SERVICE PROGRESS NOTE    Connie Rojas 19 y.o. male MRN: [id number]  Unit/Bed#: [facility] [place] 382-01 Encounter: [id number]         ASSESSMENT/PLAN     Connie Rojas is a 19 y.o. male, who is admitted to [facility] on 201 voluntary commitment for agitation and aggression. Currently, will continue current medication regimen with lithium and BMP recheck on 10/3. Principal Problem:    Mood disorder ([address])  Active Problems:    ADHD (attention deficit hyperactivity disorder)    Intermittent explosive disorder in adult    Autism spectrum disorder    Medical clearance for psychiatric admission    Obesity (BMI 35.0-39.9 without comorbidity)      RECOMMENDED TREATMENT     Continue current medications:  Continue buspirone 15 mg twice a day for anxiety  Continue Lithobid 600 mg twice daily for mood stabilization  Continue melatonin 3 mg daily at bedtime for sleep  Continue risperidone 1 mg daily, 3 mg at bedtime for psychosis/mood  Continue trazodone 50 mg daily at bedtime for sleep  Check lithium, BMP on 10/3  All current active medications have been reviewed  Encourage group therapy, milieu therapy and occupational therapy  Behavioral Health checks every 15 minutes    Risks / Benefits of Treatment: Risks, benefits, and possible side effects of medications explained to patient and patient verbalizes understanding and agreement for treatment. Counseling / Coordination of Care: Patient's progress discussed with staff in treatment team meeting. Medications, treatment progress and treatment plan reviewed with patient. Legal status: 201 voluntary commitment  Disposition: return to previous living arrangement         SUBJECTIVE     Over the last 24 hours:  Per nursing note: " Pt is pleasant and cooperative. Pt is med compliant. Pt was upset saying "why am I taking more medication. They just keep feeding me more pills". Pt was able to be redirected. Pt denies depression and anxiety. Pt denies SI/HI and AH/VH.  No unmet needs reported. Will continue to monitor.  "  PRN medications: none  Behavior over the last 24 hours: unchanged. The patient was seen and evaluated today for continuity of care. On interview, the patient is sitting comfortable in seat, was playing Bingo in group room previously. Patient reports "good" mood. Patient endorses "better" energy levels. Patient reports "not great" sleep. He reported waking up at night and could not return to sleep. He reported tossing and turning in bed and slept total about 6 hours. Per patient, appetite is "good". Patient is happy about having double portions now. Patient is distracted at times, beginning to describe plumbing and describing a toilet structure called a "snake". He then describes how he might be "covered in sewage if a pipe burst above me" if the "plumbers were repairing something caused a leak". Currently, patient denies auditory hallucinations and denies visual hallucinations. The patient denies current passive or active suicidal ideation, intent, or plan. Patient denies current passive or active homicidal ideation, intent, or plan. Patient reports tolerating medications well and denies adverse effects at this time. He describes "not liking that I've gotten three needles just this week", referring to blood draws. He describes wanting to be discharged, laughing to himself at times as he tells stories about "shelter", "social security checks", "paperwork". Team discussed treatment plan, to which patient is amenable. Patient does not endorse additional questions or concerns at this time. Participates more in milieu. Participates appropriately in group therapy. Attends some groups. Socializes with peers.     Progress Toward Goals: progressing, improving, less anxious, more distracted    Review of Systems:  Sleep: decreased  Appetite: normal  Medication side effects: No   ROS: reports no complaints, denies any headache, shortness of breath, chest pain, constipation or diarrhea, all other systems are negative      OBJECTIVE     Vital signs in last 24 hours:   Temp:  [97.8 °F (36.6 °C)-98.5 °F (36.9 °C)] 97.8 °F (36.6 °C)  HR:  [] 90  Resp:  [18] 18  BP: (120-173)/(74-84) 120/74    Mental Status Exam:  Appearance: alert, appears stated age, casually dressed, disheveled, obese, curly blonde hair, bearded and smiling  Behavior: cooperative, mildly anxious, still pleasant, slightly restless, fair eye contact  Motor: no abnormal movements  Gait/Station: normal gait/station, normal balance  Speech: normal rate, normal volume, normal pitch, spontaneous, increased rate at times, garbled at times  Mood: "good"  Affect: brighter, easily reactive  Thought process: circumstantial, normal rate of thoughts  Associations: circumstantial associations  Thought content: no overt delusions  Perceptual disturbances: denies auditory hallucinations when asked, denies visual hallucinations when asked, appears distracted, does not appear responding to internal stimuli  Risk potential:   Suicidal ideation - None  Homicidal ideation - None  Potential for aggression - Yes due to history of poor impulse control and agitation  Cognition: memory grossly intact and appears to be below average intelligence  Sensorium: oriented to person, place and year  Consciousness: alert and awake  Attention/Concentration: attention span and concentration appear shorter than expected for age  Insight: Limited  Judgment: Limited     Suicide/Homicide Risk Assessment:  Risk of Harm to Self:   Nursing Suicide Risk Assessment Last 24 hours: C-SSRS Risk (Since Last Contact)  Calculated C-SSRS Risk Score (Since Last Contact): No Risk Indicated  Current Specific Risk Factors include: mental illness diagnosis, current anxiety symptoms, lack of support  Protective Factors: Protective Factors:  The patient has desire to live, The patient does not want to die, The patient has no thoughts of suicide, no current suicidal ideation, ability to communicate with staff on the unit, able to contract for safety on the unit, taking medications as ordered on the unit, ability to adapt to change, ability to manage anger well, ability to make plans for the future, improved anxiety symptoms, responds to redirection, compliant with mental health treatment, having a desire to be alive, having a sense of purpose or meaning in life  Based on today's assessment, Katelin Busby presents the following risk of harm to self: low  Risk of Harm to Others:  Nursing Homicide Risk Assessment: Violence Risk to Others: Yes- Within the last 6 months  Current Specific Risk Factors include: recent history of violent behavior, social difficulties  Protective Factors: no current homicidal ideation, able to communicate with staff on the unit, improved impulse control, no current psychotic symptoms, compliant with medications on the unit as ordered, compliant with unit milieu, follows staff redirection  Based on today's Kylah Milligan presents the following risk of harm to others: low  The following interventions are recommended: behavioral checks every 7 minutes, continued hospitalization on locked unit      Nutrition Assessment and Intervention:     Reviewed food recall journal      Physical Activity Assessment and Intervention:    Activity journal reviewed      Emotional and Mental Well-being, Sleep, Connectedness Assessment and Intervention:    Sleep/stress assessment performed           ACTIVE MEDICATIONS     Current Medications:  Current Facility-Administered Medications   Medication Dose Route Frequency Provider Last Rate   • acetaminophen  650 mg Oral Q6H PRN Ivana Rabago MD     • acetaminophen  650 mg Oral Q4H PRN Ivana Rabago MD     • acetaminophen  975 mg Oral Q6H PRN Ivana Rabago MD     • aluminum-magnesium hydroxide-simethicone  30 mL Oral Q4H PRN Ivana Rabago MD     • benztropine  1 mg Intramuscular Q4H PRN Max 6/day Ivana Rabago MD • benztropine  1 mg Oral Q4H PRN Max 6/day Anton Martino MD     • busPIRone  15 mg Oral BID Anton Martino MD     • cholecalciferol  2,000 Units Oral Daily Anton Martino MD     • hydrOXYzine HCL  50 mg Oral Q6H PRN Max 4/day Anton Martino MD      Or   • diphenhydrAMINE  50 mg Intramuscular Q6H PRN Anton Martino MD     • hydrOXYzine HCL  100 mg Oral Q6H PRN Max 4/day Anton Martino MD      Or   • LORazepam  2 mg Intramuscular Q6H PRN Anton Martino MD     • hydrOXYzine HCL  25 mg Oral Q6H PRN Max 4/day Anton Martino MD     • lithium carbonate  600 mg Oral BID Anton Martino MD     • melatonin  3 mg Oral HS Anton Martino MD     • OLANZapine  10 mg Oral Q3H PRN Max 3/day Anton Martino MD      Or   • OLANZapine  10 mg Intramuscular Q3H PRN Max 3/day Anton Martino MD     • OLANZapine  5 mg Oral Q3H PRN Max 6/day Anton Martino MD      Or   • OLANZapine  5 mg Intramuscular Q3H PRN Max 6/day Anton Martino MD     • OLANZapine  2.5 mg Oral Q3H PRN Max 8/day Anton Martino MD     • propranolol  10 mg Oral Q8H PRN Anton Martino MD     • risperiDONE  1 mg Oral Daily Trinidad Po, MD     • risperiDONE  3 mg Oral HS Anton Martino MD     • senna-docusate sodium  1 tablet Oral Daily PRN Anton Martino MD     • traZODone  50 mg Oral HS Anton Martino MD     • traZODone  50 mg Oral HS PRN Anton Martino MD         Behavioral Health Medications: I have personally reviewed all current active medications. Changes as in plan section above.       ADDITIONAL DATA     EKG Results: reviewed  Results for orders placed or performed during the hospital encounter of 09/19/23 (from the past 1000 hour(s))   ECG 12 lead    Collection Time: 09/22/23  5:04 PM   Result Value    Ventricular Rate 86    Atrial Rate 86    IA Interval 142    QRSD Interval 96    QT Interval 348    QTC Interval 416    P Axis 36    QRS Axis 83    T Wave Axis 43    Narrative    Normal sinus rhythm  Normal ECG  No previous ECGs available  Confirmed by Aislinn Villasenor ([id number]) on 9/25/2023 8:30:08 AM     *Note: Due to a large number of results and/or encounters for the requested time period, some results have not been displayed. A complete set of results can be found in Results Review. Radiology Results: Reviewed, no new imaging  No results found. No Chest XR results available for this patient. Laboratory Results: I have personally reviewed all pertinent laboratory/tests results  Results from the past 24 hours: No results found for this or any previous visit (from the past 24 hour(s)).       This note was not shared with the patient due to reasonable likelihood of causing patient harm        Kay Mccabe MD 10/02/23  Department of Psychiatry and [facility]

## 2023-10-02 NOTE — NURSING NOTE
Pt is pleasant and cooperative. Pt is med compliant. Pt was upset saying "why am I taking more medication. They just keep feeding me more pills". Pt was able to be redirected. Pt denies depression and anxiety. Pt denies SI/HI and AH/VH. No unmet needs reported. Will continue to monitor.

## 2023-10-02 NOTE — PROGRESS NOTES
10/02/23 0836   Team Meeting   Meeting Type Daily Rounds   Team Members Present   Team Members Present Physician;Nurse;   Physician Team Member Merlin Harlem / Paddy Jasso Team Member SYLVIA AMOR [facility] Management Team Member Daksha   Patient/Family Present   Patient Present No   Patient's Family Present No     Pt denies symptoms. Visible and social. Lithium lvl 0.2 on 9/30. Continue med management -increased Lithium 600 bid. Continue to monitor. D/c late this week or early next week. Continue to monitor.

## 2023-10-02 NOTE — NURSING NOTE
Pt visible and social on unit. Watching TV in small TV room with peers. Pleasant on approach and denies symptoms and needs. Compliant with unit routines and care. Safety checks continue.

## 2023-10-03 LAB
ANION GAP SERPL CALCULATED.3IONS-SCNC: 10 MMOL/L
BUN SERPL-MCNC: 11 MG/DL (ref 5–25)
CALCIUM SERPL-MCNC: 9.6 MG/DL (ref 8.4–10.2)
CHLORIDE SERPL-SCNC: 101 MMOL/L (ref 96–108)
CO2 SERPL-SCNC: 27 MMOL/L (ref 21–32)
CREAT SERPL-MCNC: 0.65 MG/DL (ref 0.6–1.3)
GFR SERPL CREATININE-BSD FRML MDRD: 140 ML/MIN/1.73SQ M
GLUCOSE P FAST SERPL-MCNC: 99 MG/DL (ref 65–99)
GLUCOSE SERPL-MCNC: 99 MG/DL (ref 65–140)
LITHIUM SERPL-SCNC: 0.5 MMOL/L (ref 0.6–1.2)
POTASSIUM SERPL-SCNC: 3.8 MMOL/L (ref 3.5–5.3)
SODIUM SERPL-SCNC: 138 MMOL/L (ref 135–147)

## 2023-10-03 PROCEDURE — 80048 BASIC METABOLIC PNL TOTAL CA: CPT | Performed by: PSYCHIATRY & NEUROLOGY

## 2023-10-03 PROCEDURE — 80178 ASSAY OF LITHIUM: CPT | Performed by: PSYCHIATRY & NEUROLOGY

## 2023-10-03 PROCEDURE — 99232 SBSQ HOSP IP/OBS MODERATE 35: CPT | Performed by: PSYCHIATRY & NEUROLOGY

## 2023-10-03 RX ADMIN — BUSPIRONE HYDROCHLORIDE 15 MG: 10 TABLET ORAL at 08:12

## 2023-10-03 RX ADMIN — RISPERIDONE 1 MG: 1 TABLET, ORALLY DISINTEGRATING ORAL at 08:13

## 2023-10-03 RX ADMIN — LITHIUM CARBONATE 600 MG: 300 TABLET, EXTENDED RELEASE ORAL at 17:41

## 2023-10-03 RX ADMIN — TRAZODONE HYDROCHLORIDE 50 MG: 50 TABLET ORAL at 21:27

## 2023-10-03 RX ADMIN — LITHIUM CARBONATE 600 MG: 300 TABLET, EXTENDED RELEASE ORAL at 08:12

## 2023-10-03 RX ADMIN — BUSPIRONE HYDROCHLORIDE 15 MG: 10 TABLET ORAL at 17:41

## 2023-10-03 RX ADMIN — MELATONIN TAB 3 MG 3 MG: 3 TAB at 21:27

## 2023-10-03 RX ADMIN — CHOLECALCIFEROL TAB 25 MCG (1000 UNIT) 2000 UNITS: 25 TAB at 08:12

## 2023-10-03 RX ADMIN — RISPERIDONE 3 MG: 3 TABLET, ORALLY DISINTEGRATING ORAL at 21:27

## 2023-10-03 NOTE — PLAN OF CARE
Problem: Risk for Violence/Aggression Toward Others  Goal: Treatment Goal: Refrain from acts of violence/aggression during length of stay, and demonstrate improved impulse control at the time of discharge  Outcome: Progressing  Goal: Refrain from harming others  Outcome: Progressing     Problem: Ineffective Coping  Goal: Participates in unit activities  Description: Interventions:  - Provide therapeutic environment   - Provide required programming   - Redirect inappropriate behaviors   Outcome: Progressing

## 2023-10-03 NOTE — NURSING NOTE
Patient is calm and cooperative. Visible on unit, socializing with peers. Denies SI/HI/AH/VH. Med and meal complaint.

## 2023-10-03 NOTE — NURSING NOTE
Pt calm and cooperative. Visile on unit socializing with peers and going to groups. Denies SI, HI and hallucinations. Compliant with medications and meals. Denies any unmet needs. Continuous safety checks maintained.

## 2023-10-03 NOTE — PROGRESS NOTES
10/03/23 0815   Team Meeting   Meeting Type Daily Rounds   Team Members Present   Team Members Present Physician;Nurse;   Physician Team Member [id number] Team Member SYLVIA AMOR [facility] Management Team Member Daksha   Patient/Family Present   Patient Present No   Patient's Family Present No     Slow improvement - pt denies symptoms. Pt visible and compliant with meds and meals. Lithium being adjusted and monitored. Discharge Thursday vs Monday depending on pt's labs on 10/5 . Continue to monitor.

## 2023-10-03 NOTE — PROGRESS NOTES
BEHAVIORAL HEALTH SERVICE PROGRESS NOTE    Trena Fulton 19 y.o. male MRN: [id number]  Unit/Bed#: [facility] 382-01 Encounter: [id number]         ASSESSMENT/PLAN     Trena Fulton is a 19 y.o. male, who is admitted to [address] on 201 voluntary commitment for agitation and aggression. Patient had blood draw this morning for lithium level and BMP, which was Li 0.5 and otherwise unremarkable. Patient is slightly irritable about blood draws and that discharge date is dependent on that. Currently, no medication changes. Principal Problem:    Mood disorder ([address])  Active Problems:    ADHD (attention deficit hyperactivity disorder)    Intermittent explosive disorder in adult    Autism spectrum disorder    Medical clearance for psychiatric admission    Obesity (BMI 35.0-39.9 without comorbidity)      RECOMMENDED TREATMENT     Continue current medications: BuSpar 15 mg twice daily for anxiety  Lithobid 600 mg twice daily for mood  Melatonin 3 mg at bedtime for sleep  Risperdal 1 mg daily, 3 mg HS for psychosis  Trazodone 50 mg HS for sleep  Check lithium level and BMP results on Thursday  All current active medications have been reviewed  Encourage group therapy, milieu therapy and occupational therapy  Behavioral Health checks every 15 minutes    Risks / Benefits of Treatment: Risks, benefits, and possible side effects of medications explained to patient and patient verbalizes understanding and agreement for treatment. Counseling / Coordination of Care: Patient's progress discussed with staff in treatment team meeting. Medications, treatment progress and treatment plan reviewed with patient. Legal status: 201 voluntary commitment  Disposition: return to previous living arrangement         SUBJECTIVE     Over the last 24 hours:   Per nursing note: " Pt visible and social on unit. Watching TV in small TV room with peers. Pleasant on approach and denies symptoms and needs. Compliant with unit routines and care.  Safety checks continue. "  PRN medications: none  Behavior over the last 24 hours: unchanged. The patient was seen and evaluated today for continuity of care. On interview, the patient is sitting comfortably in chair. Patient reports "good" mood. Patient endorses "low" energy levels. Patient reports "good" sleep, reporting 8+ hours of sleep. Per patient, appetite is "good". Patient is somewhat distracted, stating he "is trying to think straight", stating he is thinking about "setting plans up", "getting outside", "what I will do when I am discharged". Currently, patient denies auditory hallucinations and denies visual hallucinations. The patient denies current passive or active suicidal ideation, intent, or plan. Patient denies current passive or active homicidal ideation, intent, or plan. Patient reports  tolerating medications well and denies adverse effects at this time. Team discussed treatment plan, to which patient is amenable. Patient does not endorse additional questions or concerns at this time. Attends some groups. Interacts appropriately with peers.     Progress Toward Goals: progressing, slightly more anxious, still cooperative, somewhat irritable, discharge planning    Review of Systems:  Sleep: normal  Appetite: normal  Medication side effects: No   ROS: reports no complaints, denies any headache, shortness of breath or chest pain, all other systems are negative      OBJECTIVE     Vital signs in last 24 hours:   Temp:  [97.9 °F (36.6 °C)-98.5 °F (36.9 °C)] 97.9 °F (36.6 °C)  HR:  [87-98] 87  Resp:  [17-18] 17  BP: (125-126)/(69-76) 126/76    Mental Status Exam:  Appearance: alert, appears stated age, casually dressed, marginal grooming/hygiene, obese, curly hair and bearded  Behavior: cooperative, slightly restless  Motor: no abnormal movements  Gait/Station: normal gait/station, normal balance  Speech: normal rate, normal volume, normal pitch, spontaneous  Mood: "good"  Affect: brighter, still reactive  Thought process: circumstantial, normal rate of thoughts  Associations: circumstantial associations  Thought content: no overt delusions  Perceptual disturbances: no auditory hallucinations, no visual hallucinations, appears distracted, does not appear responding to internal stimuli  Risk potential:   Suicidal ideation - None  Homicidal ideation - None  Potential for aggression - Yes, due to poor impulse control  Cognition: memory grossly intact and appears to be of average intelligence  Sensorium: oriented to person, place and year  Consciousness: alert and awake  Attention/Concentration: attention span and concentration appear shorter than expected for age  Insight: Limited  Judgment: Limited     Suicide/Homicide Risk Assessment:  Risk of Harm to Self:   Nursing Suicide Risk Assessment Last 24 hours: C-SSRS Risk (Since Last Contact)  Calculated C-SSRS Risk Score (Since Last Contact): No Risk Indicated  Current Specific Risk Factors include: diagnosis of mood disorder, mental illness diagnosis, current anxiety symptoms, poor reasoning, poor impulse control  Protective Factors: Protective Factors:  The patient has desire to live, The patient does not want to die, The patient cares enough about his/her self to live, no current suicidal ideation, ability to communicate with staff on the unit, able to contract for safety on the unit, taking medications as ordered on the unit, ability to adapt to change, ability to manage anger well, ability to make plans for the future  Based on today's assessment, Irving Pallas presents the following risk of harm to self: low  Risk of Harm to Others:  Nursing Homicide Risk Assessment: Violence Risk to Others: Yes- Within the last 6 months  Current Specific Risk Factors include: impaired cognition, behavior suggesting impulsivity, diagnosis of mood disorder, multiple stressors, social difficulties  Protective Factors: no current homicidal ideation, able to communicate with staff on the unit, impulse control is improving, improved mood, no current psychotic symptoms, willing to continue psychiatric treatment, willing to remain free from substance use  Based on today's assessment, Celia Higgins presents the following risk of harm to others: low  The following interventions are recommended: behavioral checks every 7 minutes, continued hospitalization on locked unit      Nutrition Assessment and Intervention:     Reviewed food recall journal      Emotional and Mental Well-being, Sleep, Connectedness Assessment and Intervention:    Sleep/stress assessment performed           ACTIVE MEDICATIONS     Current Medications:  Current Facility-Administered Medications   Medication Dose Route Frequency Provider Last Rate   • acetaminophen  650 mg Oral Q6H PRN Karoline Damon MD     • acetaminophen  650 mg Oral Q4H PRN Karoline Damon MD     • acetaminophen  975 mg Oral Q6H PRN Karoline Damon MD     • aluminum-magnesium hydroxide-simethicone  30 mL Oral Q4H PRN Karoline Damon MD     • benztropine  1 mg Intramuscular Q4H PRN Max 6/day Karoline Damon MD     • benztropine  1 mg Oral Q4H PRN Max 6/day Karoline Damon MD     • busPIRone  15 mg Oral BID Karoline Damon MD     • cholecalciferol  2,000 Units Oral Daily Karoline Damon MD     • hydrOXYzine HCL  50 mg Oral Q6H PRN Max 4/day Karoline Damon MD      Or   • diphenhydrAMINE  50 mg Intramuscular Q6H PRN Karoline Damon MD     • hydrOXYzine HCL  100 mg Oral Q6H PRN Max 4/day Karoline Damon MD      Or   • LORazepam  2 mg Intramuscular Q6H PRN Karoline Damon MD     • hydrOXYzine HCL  25 mg Oral Q6H PRN Max 4/day Karoline Damon MD     • lithium carbonate  600 mg Oral BID Karoline Damon MD     • melatonin  3 mg Oral HS Karoline Damon MD     • OLANZapine  10 mg Oral Q3H PRN Max 3/day Karoline Damon MD      Or   • OLANZapine  10 mg Intramuscular Q3H PRN Max 3/day Karoline Damon MD     • OLANZapine  5 mg Oral Q3H PRN Max 6/day Dyan Clemens MD      Or   • OLANZapine  5 mg Intramuscular Q3H PRN Max 6/day Dyan Clemens MD     • OLANZapine  2.5 mg Oral Q3H PRN Max 8/day Dyan Clemens MD     • propranolol  10 mg Oral Q8H PRN Dyan Clemens MD     • risperiDONE  1 mg Oral Daily James Hernandez MD     • risperiDONE  3 mg Oral HS Dyan Clemens MD     • senna-docusate sodium  1 tablet Oral Daily PRN Dyan Clemens MD     • traZODone  50 mg Oral HS Dyan Clemens MD     • traZODone  50 mg Oral HS PRN Dyan Clemens MD         Behavioral Health Medications: I have personally reviewed all current active medications. Changes as in plan section above. ADDITIONAL DATA     EKG Results: reviewed  Results for orders placed or performed during the hospital encounter of 09/19/23 (from the past 1000 hour(s))   ECG 12 lead    Collection Time: 09/22/23  5:04 PM   Result Value    Ventricular Rate 86    Atrial Rate 86    UT Interval 142    QRSD Interval 96    QT Interval 348    QTC Interval 416    P Axis 36    QRS Axis 83    T Wave Axis 43    Narrative    Normal sinus rhythm  Normal ECG  No previous ECGs available  Confirmed by Rachelle Quach ([id number]) on 9/25/2023 8:30:08 AM     *Note: Due to a large number of results and/or encounters for the requested time period, some results have not been displayed. A complete set of results can be found in Results Review. Radiology Results: Reviewed, no new imaging  No results found. No Chest XR results available for this patient. Laboratory Results: I have personally reviewed all pertinent laboratory/tests results  Results from the past 24 hours: No results found for this or any previous visit (from the past 24 hour(s)).       This note was not shared with the patient due to reasonable likelihood of causing patient harm        James Hernandez MD 10/03/23  Department of Psychiatry and [facility]

## 2023-10-03 NOTE — CASE MANAGEMENT
Writer called [phone] Timur Dobson at pt's residence -[facility]. Provided status update, current meds and discharge plan for Thursday depending on pt's labs that morning. Writer advised that pt's follow up mhop appointment will be schedule with [facility]. Timur Dobson requested that their staff must meet in-person before patient returns back to the residence. This meeting is scheduled for tomorrow, 10/4 at 1 PM. Timur Dobson requested that pt's meds be sent to [address] [place] [address] at least 24 hours prior to discharge. A virtual discharge coordination meeting is scheduled on 10/5/23 at 1130 AM. A teams invite has been sent to the concerned parties involved in patient's care. Tentative discharge plan is for 10/5, Thursday afternoon. Continue to coordinate.

## 2023-10-04 PROCEDURE — 99232 SBSQ HOSP IP/OBS MODERATE 35: CPT | Performed by: PSYCHIATRY & NEUROLOGY

## 2023-10-04 RX ADMIN — MELATONIN TAB 3 MG 3 MG: 3 TAB at 21:09

## 2023-10-04 RX ADMIN — RISPERIDONE 3 MG: 3 TABLET, ORALLY DISINTEGRATING ORAL at 21:09

## 2023-10-04 RX ADMIN — LITHIUM CARBONATE 600 MG: 300 TABLET, EXTENDED RELEASE ORAL at 08:00

## 2023-10-04 RX ADMIN — LITHIUM CARBONATE 600 MG: 300 TABLET, EXTENDED RELEASE ORAL at 17:30

## 2023-10-04 RX ADMIN — TRAZODONE HYDROCHLORIDE 50 MG: 50 TABLET ORAL at 21:09

## 2023-10-04 RX ADMIN — RISPERIDONE 1 MG: 1 TABLET, ORALLY DISINTEGRATING ORAL at 08:00

## 2023-10-04 RX ADMIN — BUSPIRONE HYDROCHLORIDE 15 MG: 10 TABLET ORAL at 08:00

## 2023-10-04 RX ADMIN — BUSPIRONE HYDROCHLORIDE 15 MG: 10 TABLET ORAL at 17:30

## 2023-10-04 RX ADMIN — CHOLECALCIFEROL TAB 25 MCG (1000 UNIT) 2000 UNITS: 25 TAB at 08:00

## 2023-10-04 NOTE — CASE MANAGEMENT
[facility] was called and pt's in-person med check scheduled on Monday, 10/16/23 at 11:30 AM with Ann JerrysAIRAM. This has been added to pt's AVs. Continue to monitor.

## 2023-10-04 NOTE — PROGRESS NOTES
BEHAVIORAL HEALTH SERVICE PROGRESS NOTE    Richard Tucker 19 y.o. male MRN: [id number]  Unit/Bed#: [facility] 382-01 Encounter: [id number]         ASSESSMENT/PLAN     Richard Tucker is a 19 y.o. male, who is admitted to [address] on 201 voluntary commitment for agitation and aggression. Currently, no anticipated medication changes. Principal Problem:    Mood disorder ([address])  Active Problems:    ADHD (attention deficit hyperactivity disorder)    Intermittent explosive disorder in adult    Autism spectrum disorder    Medical clearance for psychiatric admission    Obesity (BMI 35.0-39.9 without comorbidity)      RECOMMENDED TREATMENT     Continue current medications: BuSpar 15 mg BID for anxiety  Lithobid 600 mg BID for mood  Melatonin 3 mg HS for sleep  Risperdal 1 mg daily, 3 mg HS for psychosis  Trazodone 50 mg HS for sleep  Check lithium level, BMP on 10/5  All current active medications have been reviewed  Encourage group therapy, milieu therapy and occupational therapy  Behavioral Health checks every 15 minutes    Risks / Benefits of Treatment: Risks, benefits, and possible side effects of medications explained to patient and patient verbalizes understanding and agreement for treatment. Counseling / Coordination of Care: Patient's progress discussed with staff in treatment team meeting. Medications, treatment progress and treatment plan reviewed with patient. Legal status: 201 voluntary commitment  Disposition: return to previous living arrangement         SUBJECTIVE     Over the last 24 hours:  Per nursing note: " Pt calm and cooperative, Pt pleasant upon approach, Pt visible on the unit seen in the dayroom watching TV, pacing the unit and talking with peers, Pt denied all pain anxiety and depression, Pt denies HI and SI , Pt denied AH and VH, Pt took all scheduled HS meds, Q7 min safety checks maintained "  PRN medications: none  Behavior over the last 24 hours: unchanged.      The patient was seen and evaluated today for continuity of care. On interview, the patient is sitting comfortably and making jokes. Patient reports "good" mood. Patient endorses "good" energy levels. Patient reports "good" sleep. Per patient, appetite is "good". Patient is brighter and telling stories about his pets growing up, sharing he had pet snakes. Currently, patient denies auditory hallucinations and denies visual hallucinations. The patient denies current passive or active suicidal ideation, intent, or plan. Patient denies current passive or active homicidal ideation, intent, or plan. Patient reports  tolerating medications well and denies adverse effects at this time. Team discussed treatment plan, to which patient is amenable. Patient does not endorse additional questions or concerns at this time. Compliant with medications. Follows directions appropriately. Participates appropriately in milieu. More visible on the unit.     Progress Toward Goals: progressing, attends groups, participates in milieu therapy, no longer anxious    Review of Systems:  Sleep: normal  Appetite: normal  Medication side effects: No   ROS: no complaints, denies any headache, shortness of breath or chest pain, all other systems are negative      OBJECTIVE     Vital signs in last 24 hours:   Temp:  [97.5 °F (36.4 °C)-98.4 °F (36.9 °C)] 97.5 °F (36.4 °C)  HR:  [] 88  Resp:  [16] 16  BP: (119-128)/(58-78) 119/58    Mental Status Exam:  Appearance: alert, appears stated age, casually dressed, marginal grooming/hygiene and obese  Behavior: cooperative, calm  Motor: no abnormal movements  Gait/Station: normal gait/station, normal balance  Speech: normal rate, normal volume, normal pitch  Mood: "good"  Affect: brighter  Thought process: goal directed, normal rate of thoughts  Associations: circumstantial associations  Thought content: no overt delusions  Perceptual disturbances: no auditory hallucinations, no visual hallucinations, does not appear responding to internal stimuli  Risk potential:   Suicidal ideation - None  Homicidal ideation - None  Potential for aggression - Not at present  Cognition: memory grossly intact and appears to be of average intelligence  Sensorium: oriented to person, place and time/date  Consciousness: alert and awake  Attention/Concentration: attention span and concentration appear shorter than expected for age  Insight: Limited  Judgment: Improving     Suicide/Homicide Risk Assessment:  Risk of Harm to Self:   Nursing Suicide Risk Assessment Last 24 hours: C-SSRS Risk (Since Last Contact)  Calculated C-SSRS Risk Score (Since Last Contact): No Risk Indicated  Current Specific Risk Factors include: diagnosis of mood disorder, mental illness diagnosis, impaired cognition  Protective Factors: Protective Factors:  The patient has desire to live, The patient does not want to die, The patient cannot leave family/children, no current suicidal ideation, ability to manage anger well, ability to make plans for the future  Based on today's assessmentCharlie presents the following risk of harm to self: low  Risk of Harm to Others:  Nursing Homicide Risk Assessment: Violence Risk to Others: Yes- Within the last 6 months  Current Specific Risk Factors include: diagnosis of mood disorder, multiple stressors, social difficulties  Protective Factors: no current homicidal ideation, able to communicate with staff on the unit, improved impulse control, improved mood, no current psychotic symptoms, compliant with medications on the unit as ordered, compliant with unit milieu  Based on today's assessment, Charlie Horton presents the following risk of harm to others: low  The following interventions are recommended: behavioral checks every 7 minutes, continued hospitalization on locked unit      Nutrition Assessment and Intervention:     Reviewed food recall journal      Emotional and Mental Well-being, Sleep, Connectedness Assessment and Intervention:    Sleep/stress assessment performed           ACTIVE MEDICATIONS     Current Medications:  Current Facility-Administered Medications   Medication Dose Route Frequency Provider Last Rate   • acetaminophen  650 mg Oral Q6H PRN Karoline Damon MD     • acetaminophen  650 mg Oral Q4H PRN Karoline Damon MD     • acetaminophen  975 mg Oral Q6H PRN Karoline Damon MD     • aluminum-magnesium hydroxide-simethicone  30 mL Oral Q4H PRN Karoline Damon MD     • benztropine  1 mg Intramuscular Q4H PRN Max 6/day Karoline Damon MD     • benztropine  1 mg Oral Q4H PRN Max 6/day Karoline Damon MD     • busPIRone  15 mg Oral BID Karoline Damon MD     • cholecalciferol  2,000 Units Oral Daily Karoline Damon MD     • hydrOXYzine HCL  50 mg Oral Q6H PRN Max 4/day Karoline Damon MD      Or   • diphenhydrAMINE  50 mg Intramuscular Q6H PRN Karoline Damon MD     • hydrOXYzine HCL  100 mg Oral Q6H PRN Max 4/day Karoline Damon MD      Or   • LORazepam  2 mg Intramuscular Q6H PRN Karoline Damon MD     • hydrOXYzine HCL  25 mg Oral Q6H PRN Max 4/day Karoline Damon MD     • lithium carbonate  600 mg Oral BID Karoline Damon MD     • melatonin  3 mg Oral HS Karoline Damon MD     • OLANZapine  10 mg Oral Q3H PRN Max 3/day Karoline Damon MD      Or   • OLANZapine  10 mg Intramuscular Q3H PRN Max 3/day Karoline Damon MD     • OLANZapine  5 mg Oral Q3H PRN Max 6/day Karoline Damon MD      Or   • OLANZapine  5 mg Intramuscular Q3H PRN Max 6/day Karoline Damon MD     • OLANZapine  2.5 mg Oral Q3H PRN Max 8/day Karoline Damon MD     • propranolol  10 mg Oral Q8H PRN Karoline Damon MD     • risperiDONE  1 mg Oral Daily Claude Smack, MD     • risperiDONE  3 mg Oral HS Karoline Damon MD     • senna-docusate sodium  1 tablet Oral Daily PRN Karoline Damon MD     • traZODone  50 mg Oral HS Karolnie Damon MD     • traZODone  50 mg Oral HS PRN Karoline Damon MD         Behavioral Health Medications: I have personally reviewed all current active medications. Changes as in plan section above. ADDITIONAL DATA     EKG Results: reviewed  Results for orders placed or performed during the hospital encounter of 09/19/23 (from the past 1000 hour(s))   ECG 12 lead    Collection Time: 09/22/23  5:04 PM   Result Value    Ventricular Rate 86    Atrial Rate 86    [place] Interval 142    QRSD Interval 96    QT Interval 348    QTC Interval 416    P Axis 36    QRS Axis 83    T Wave Axis 43    Narrative    Normal sinus rhythm  Normal ECG  No previous ECGs available  Confirmed by Kevin Ball ([id number]) on 9/25/2023 8:30:08 AM     *Note: Due to a large number of results and/or encounters for the requested time period, some results have not been displayed. A complete set of results can be found in Results Review. Radiology Results: Reviewed, no new imaging  No results found. No Chest XR results available for this patient. Laboratory Results: I have personally reviewed all pertinent laboratory/tests results  Results from the past 24 hours: No results found for this or any previous visit (from the past 24 hour(s)).       This note was not shared with the patient due to reasonable likelihood of causing patient harm        Claude Smack, MD 10/04/23  Department of Psychiatry and [facility]

## 2023-10-04 NOTE — PLAN OF CARE
Pt seen on the unit. Compliant with meds and meals. Pt's group home staff did not come to meet, visit was scheduled for today at 1 PM. Pt appears calm and cooperative. Writer to check with group home staff and update the patient. Pt is also aware that a virtual meeting is schedule for tomorrow at 1130 AM. Pt denies SI, Hi and AVH. Continue to monitor. D/c plan Thursday pending labs.        Problem: DISCHARGE PLANNING  Goal: Discharge to home or other facility with appropriate resources  Description: INTERVENTIONS:  - Identify barriers to discharge w/patient and caregiver  - Arrange for needed discharge resources and transportation as appropriate  - Identify discharge learning needs (meds, wound care, etc.)  - Arrange for interpretive services to assist at discharge as needed  - Refer to Case Management Department for coordinating discharge planning if the patient needs post-hospital services based on physician/advanced practitioner order or complex needs related to functional status, cognitive ability, or social support system  Outcome: Progressing

## 2023-10-04 NOTE — NURSING NOTE
Pt calm and cooperative, Pt pleasant upon approach, Pt visible on the unit seen in the dayroom watching TV, pacing the unit and talking with peers, Pt denied all pain anxiety and depression, Pt denies HI and SI , Pt denied AH and VH, Pt took all scheduled HS meds, Q7 min safety checks maintained

## 2023-10-05 LAB
ANION GAP SERPL CALCULATED.3IONS-SCNC: 9 MMOL/L
BUN SERPL-MCNC: 11 MG/DL (ref 5–25)
CALCIUM SERPL-MCNC: 9.1 MG/DL (ref 8.4–10.2)
CHLORIDE SERPL-SCNC: 102 MMOL/L (ref 96–108)
CO2 SERPL-SCNC: 27 MMOL/L (ref 21–32)
CREAT SERPL-MCNC: 0.59 MG/DL (ref 0.6–1.3)
GFR SERPL CREATININE-BSD FRML MDRD: 145 ML/MIN/1.73SQ M
GLUCOSE P FAST SERPL-MCNC: 103 MG/DL (ref 65–99)
GLUCOSE SERPL-MCNC: 103 MG/DL (ref 65–140)
LITHIUM SERPL-SCNC: 0.5 MMOL/L (ref 0.6–1.2)
POTASSIUM SERPL-SCNC: 3.8 MMOL/L (ref 3.5–5.3)
SODIUM SERPL-SCNC: 138 MMOL/L (ref 135–147)

## 2023-10-05 PROCEDURE — 99232 SBSQ HOSP IP/OBS MODERATE 35: CPT | Performed by: PSYCHIATRY & NEUROLOGY

## 2023-10-05 PROCEDURE — 80178 ASSAY OF LITHIUM: CPT

## 2023-10-05 PROCEDURE — 80048 BASIC METABOLIC PNL TOTAL CA: CPT

## 2023-10-05 RX ORDER — LITHIUM CARBONATE 300 MG/1
600 TABLET, FILM COATED, EXTENDED RELEASE ORAL DAILY
Status: DISCONTINUED | OUTPATIENT
Start: 2023-10-06 | End: 2023-10-09 | Stop reason: HOSPADM

## 2023-10-05 RX ORDER — LITHIUM CARBONATE 450 MG
900 TABLET, EXTENDED RELEASE ORAL EVERY EVENING
Status: DISCONTINUED | OUTPATIENT
Start: 2023-10-05 | End: 2023-10-06

## 2023-10-05 RX ADMIN — BUSPIRONE HYDROCHLORIDE 15 MG: 10 TABLET ORAL at 17:11

## 2023-10-05 RX ADMIN — RISPERIDONE 3 MG: 3 TABLET, ORALLY DISINTEGRATING ORAL at 21:49

## 2023-10-05 RX ADMIN — LITHIUM CARBONATE 900 MG: 450 TABLET, EXTENDED RELEASE ORAL at 17:11

## 2023-10-05 RX ADMIN — RISPERIDONE 1 MG: 1 TABLET, ORALLY DISINTEGRATING ORAL at 08:09

## 2023-10-05 RX ADMIN — MELATONIN TAB 3 MG 3 MG: 3 TAB at 21:49

## 2023-10-05 RX ADMIN — TRAZODONE HYDROCHLORIDE 50 MG: 50 TABLET ORAL at 21:49

## 2023-10-05 RX ADMIN — CHOLECALCIFEROL TAB 25 MCG (1000 UNIT) 2000 UNITS: 25 TAB at 08:10

## 2023-10-05 RX ADMIN — BUSPIRONE HYDROCHLORIDE 15 MG: 10 TABLET ORAL at 08:09

## 2023-10-05 RX ADMIN — LITHIUM CARBONATE 600 MG: 300 TABLET, EXTENDED RELEASE ORAL at 08:10

## 2023-10-05 NOTE — NURSING NOTE
Pt awakened for HS med pass and denies all symptoms and needs. Came out of room for snack and socialized with peers on unit while having snack. Compliant with unit routines, meds and care. Safety checks continue.

## 2023-10-05 NOTE — CASE MANAGEMENT
Writer called Alejandro Spangler at [facility] advised about patient's status and  subtherapeutic lvl and further med adjustment will be considered as appropriate. Pt's discharge postponed to Monday, and the virtual coordination meeting is also rescheduled on Monday, 10/9 at 1130 AM.     Writer sent out the updated Teams invitation to the staff/agencies involved. Continue to coordinate.

## 2023-10-05 NOTE — PROGRESS NOTES
10/05/23 0837   Team Meeting   Meeting Type Daily Rounds   Team Members Present   Team Members Present Physician;Nurse;   Physician Team Member [id number] Team Member [facility] Management Team Member Daksha   Patient/Family Present   Patient Present No   Patient's Family Present No     Pt denies symptoms. Compliant with meds. Lithium lvl is subtherapeutic, meds being adjusted further. Discharge postponed till Monday. Writer to coordinate with group [place] for discharge coordination meeting. Continue to monitor.

## 2023-10-05 NOTE — PROGRESS NOTES
BEHAVIORAL HEALTH SERVICE PROGRESS NOTE    Patrica Mcdonald 19 y.o. male MRN: [id number]  Unit/Bed#: [facility] 382-01 Encounter: [id number]         ASSESSMENT/PLAN     Patrica Mcdonald is a 19 y.o. male, who is admitted to [address] on 201 voluntary commitment for agitation and aggression. Lithium level this morning was subtherapeutic (0.5). Currently,  will increase evening dose to 900 mg and recheck Lithium level on Sunday. Principal Problem:    Mood disorder ([address])  Active Problems:    ADHD (attention deficit hyperactivity disorder)    Intermittent explosive disorder in adult    Autism spectrum disorder    Medical clearance for psychiatric admission    Obesity (BMI 35.0-39.9 without comorbidity)      RECOMMENDED TREATMENT     Change Lithobid to 600 mg daily, 900 mg every evening for mood  Continue current medications:  Buspar 15 mg BID for anxiety  Melatonin 3 mg HS for sleep  Risperdal 1 mg daily, 3 mg HS for psychosis/mood  Trazodone 50 mg HS for sleep  All current active medications have been reviewed  Encourage group therapy, milieu therapy and occupational therapy  Behavioral Health checks every 15 minutes    Risks / Benefits of Treatment: Risks, benefits, and possible side effects of medications explained to patient and patient verbalizes understanding and agreement for treatment. Counseling / Coordination of Care: Patient's progress discussed with staff in treatment team meeting. Medications, treatment progress and treatment plan reviewed with patient. Legal status: 201 voluntary commitment  Disposition: return to previous living arrangement         SUBJECTIVE     Over the last 24 hours:  Per nursing note: " Pt awakened for HS med pass and denies all symptoms and needs. Came out of room for snack and socialized with peers on unit while having snack. Compliant with unit routines, meds and care. Safety checks continue. "  PRN medications: none  Behavior over the last 24 hours: unchanged.      The patient was seen and evaluated today for continuity of care. On interview, the patient is initially calm, pleasant, and cooperative. Patient reports "good" mood. Patient endorses "good" energy levels. Patient reports "good" sleep. Per patient, appetite is "good". Currently, patient denies auditory hallucinations and denies visual hallucinations. The patient denies current passive or active suicidal ideation, intent, or plan. Patient denies current passive or active homicidal ideation, intent, or plan. Patient reports tolerating medications well and denies adverse effects at this time. Participates more in milieu. Patient asks about his lithium level and is informed the level is subtherapeutic, following which he is counseled that additional stay will be required. Patient becomes frustrated and somewhat agitated, making paranoid statements about how "everyone is lying to me". He then comments that he will stop talking his medications. Counseled patient following which he appeared slightly calmer and went to his room. Some time later, patient was heard expressing his frustration in the milieu to nursing staff. Staff was able to successfully redirect him, and he appeared calmer when seen in the milieu at a later time. PRN medication was not required.     Progress Toward Goals: progressing slowly, more agitated, more irritable    Review of Systems:  Sleep: normal  Appetite: normal  Medication side effects: No   ROS: no complaints, denies any headache, shortness of breath or chest pain, all other systems are negative      OBJECTIVE     Vital signs in last 24 hours:   Temp:  [97.6 °F (36.4 °C)-97.9 °F (36.6 °C)] 97.9 °F (36.6 °C)  HR:  [92-96] 92  Resp:  [16] 16  BP: (119-128)/(68-79) 119/79    Mental Status Exam:  Appearance: alert, appears stated age, casually dressed and marginal grooming/hygiene  Behavior: appears anxious, more agitated  Motor: no abnormal movements  Gait/Station: normal gait/station  Speech: normal rate, normal volume, normal pitch  Mood: "good"  Affect: constricted, more irritable  Thought process: goal directed, normal rate of thoughts  Associations: circumstantial associations  Thought content: some paranoia  Perceptual disturbances: no auditory hallucinations, no visual hallucinations, appears distracted, does not appear responding to internal stimuli  Risk potential:   Suicidal ideation - None  Homicidal ideation - None  Potential for aggression - Yes, due to agitation  Cognition: memory grossly intact and appears to be of average intelligence  Sensorium: oriented to person, place and time/date  Consciousness: alert and awake  Attention/Concentration: attention span and concentration are age appropriate  Insight: Limited  Judgment: Improving     Suicide/Homicide Risk Assessment:  Risk of Harm to Self:   Nursing Suicide Risk Assessment Last 24 hours: C-SSRS Risk (Since Last Contact)  Calculated C-SSRS Risk Score (Since Last Contact): No Risk Indicated  Current Specific Risk Factors include: diagnosis of mood disorder, mental illness diagnosis, presence of paranoid ideation, impaired cognition  Protective Factors: no current suicidal ideation, ability to communicate with staff on the unit, ability to make plans for the future  Based on today's assessment, Boo Perez presents the following risk of harm to self: low  Risk of Harm to Others:  Nursing Homicide Risk Assessment: Violence Risk to Others: Yes- Within the last 6 months  Current Specific Risk Factors include: current agitation, poor impulse control, impaired cognition, multiple stressors, social difficulties  Protective Factors: no current homicidal ideation, compliant with unit milieu, follows staff redirection  Based on today's Raul Greene presents the following risk of harm to others: low  The following interventions are recommended: behavioral checks every 7 minutes, continued hospitalization on locked unit          Emotional and Mental Well-being, Sleep, Connectedness Assessment and Intervention:    Sleep/stress assessment performed    Stress management, sleep hygiene, or social connection patient handout reviewed with patient      Therapeutic Lifestyle Change Visit:     One-on-one comprehensive counseling, coaching, and health behavior change visit completed           ACTIVE MEDICATIONS     Current Medications:  Current Facility-Administered Medications   Medication Dose Route Frequency Provider Last Rate   • acetaminophen  650 mg Oral Q6H PRN Bety Clemente MD     • acetaminophen  650 mg Oral Q4H PRN Bety Clmeente MD     • acetaminophen  975 mg Oral Q6H PRN Bety Clemente MD     • aluminum-magnesium hydroxide-simethicone  30 mL Oral Q4H PRN Bety Clemente MD     • benztropine  1 mg Intramuscular Q4H PRN Max 6/day Bety Clemente MD     • benztropine  1 mg Oral Q4H PRN Max 6/day Bety Clemente MD     • busPIRone  15 mg Oral BID Bety Clemente MD     • cholecalciferol  2,000 Units Oral Daily Bety Clemente MD     • hydrOXYzine HCL  50 mg Oral Q6H PRN Max 4/day Bety Clemente MD      Or   • diphenhydrAMINE  50 mg Intramuscular Q6H PRN Bety Clemente MD     • hydrOXYzine HCL  100 mg Oral Q6H PRN Max 4/day Bety Clemente MD      Or   • LORazepam  2 mg Intramuscular Q6H PRN Bety Clemente MD     • hydrOXYzine HCL  25 mg Oral Q6H PRN Max 4/day Bety Clemente MD     • lithium carbonate  600 mg Oral BID Bety Clemente MD     • melatonin  3 mg Oral HS Bety Clemente MD     • OLANZapine  10 mg Oral Q3H PRN Max 3/day Bety Clemente MD      Or   • OLANZapine  10 mg Intramuscular Q3H PRN Max 3/day Bety Clemente MD     • OLANZapine  5 mg Oral Q3H PRN Max 6/day Bety Clemente MD      Or   • OLANZapine  5 mg Intramuscular Q3H PRN Max 6/day Bety Clemente MD     • OLANZapine  2.5 mg Oral Q3H PRN Max 8/day Bety Clemente MD     • propranolol  10 mg Oral Q8H PRN Bety Clemente MD     • risperiDONE  1 mg Oral Daily Claude Smack, MD     • risperiDONE  3 mg Oral HS Karoline Damon MD     • senna-docusate sodium  1 tablet Oral Daily PRN Karoline Damon MD     • traZODone  50 mg Oral HS Karoline Damon MD     • traZODone  50 mg Oral HS PRN Karoline Damon MD         Behavioral Health Medications: I have personally reviewed all current active medications. Changes as in plan section above. ADDITIONAL DATA     EKG Results: reviewed  Results for orders placed or performed during the hospital encounter of 09/19/23 (from the past 1000 hour(s))   ECG 12 lead    Collection Time: 09/22/23  5:04 PM   Result Value    Ventricular Rate 86    Atrial Rate 86    [place] Interval 142    QRSD Interval 96    QT Interval 348    QTC Interval 416    P Axis 36    QRS Axis 83    T Wave Axis 43    Narrative    Normal sinus rhythm  Normal ECG  No previous ECGs available  Confirmed by Kevin Ball ([id number]) on 9/25/2023 8:30:08 AM     *Note: Due to a large number of results and/or encounters for the requested time period, some results have not been displayed. A complete set of results can be found in Results Review. Radiology Results: Reviewed, no new imaging  No results found. No Chest XR results available for this patient.      Laboratory Results: I have personally reviewed all pertinent laboratory/tests results  Results from the past 24 hours:   Recent Results (from the past 24 hour(s))   Lithium level    Collection Time: 10/05/23  6:16 AM   Result Value Ref Range    Lithium Lvl 0.5 (L) 0.6 - 1.2 mmol/L   Basic metabolic panel    Collection Time: 10/05/23  6:16 AM   Result Value Ref Range    Sodium 138 135 - 147 mmol/L    Potassium 3.8 3.5 - 5.3 mmol/L    Chloride 102 96 - 108 mmol/L    CO2 27 21 - 32 mmol/L    ANION GAP 9 mmol/L    BUN 11 5 - 25 mg/dL    Creatinine 0.59 (L) 0.60 - 1.30 mg/dL    Glucose 103 65 - 140 mg/dL    Glucose, Fasting 103 (H) 65 - 99 mg/dL    Calcium 9.1 8.4 - 10.2 mg/dL    eGFR 145 ml/min/1.73sq m Last Laboratory Results with Lithium level:   Lab Results   Component Value Date    SODIUM 138 10/05/2023    K 3.8 10/05/2023     10/05/2023    CO2 27 10/05/2023    BUN 11 10/05/2023    CREATININE 0.59 (L) 10/05/2023    GLUC 103 10/05/2023    CALCIUM 9.1 10/05/2023    LITHIUM 0.5 (L) 10/05/2023         This note was not shared with the patient due to reasonable likelihood of causing patient harm        Sade Solorio MD 10/05/23  Department of Psychiatry and [facility]

## 2023-10-05 NOTE — NURSING NOTE
Denies SI/HI/SIB/AVH/Pain. Calm and in control. Cooperative. Med and meal compliant. Pleasant. Observed in dayroom and milieu. Appetite good. Wearing own clothes. Offers no complaints. Socializing with staff and peers. 1100, pt was upset because he was told he was leaving today, and then told he is not due to his Lithium level. Pt educated better on the required lab work. Pt able to be redirected with staff, with no issue. Pt appears to be using his positive coping skills, requested headphones to listen to music. No behaviors this shift.

## 2023-10-06 PROCEDURE — 99232 SBSQ HOSP IP/OBS MODERATE 35: CPT | Performed by: PSYCHIATRY & NEUROLOGY

## 2023-10-06 RX ORDER — LITHIUM CARBONATE 450 MG
900 TABLET, EXTENDED RELEASE ORAL EVERY EVENING
Status: DISCONTINUED | OUTPATIENT
Start: 2023-10-06 | End: 2023-10-09 | Stop reason: HOSPADM

## 2023-10-06 RX ADMIN — RISPERIDONE 1 MG: 1 TABLET, ORALLY DISINTEGRATING ORAL at 08:10

## 2023-10-06 RX ADMIN — LITHIUM CARBONATE 900 MG: 450 TABLET, EXTENDED RELEASE ORAL at 21:07

## 2023-10-06 RX ADMIN — BUSPIRONE HYDROCHLORIDE 15 MG: 10 TABLET ORAL at 08:10

## 2023-10-06 RX ADMIN — BUSPIRONE HYDROCHLORIDE 15 MG: 10 TABLET ORAL at 17:19

## 2023-10-06 RX ADMIN — CHOLECALCIFEROL TAB 25 MCG (1000 UNIT) 2000 UNITS: 25 TAB at 08:10

## 2023-10-06 RX ADMIN — RISPERIDONE 3 MG: 3 TABLET, ORALLY DISINTEGRATING ORAL at 21:07

## 2023-10-06 RX ADMIN — LITHIUM CARBONATE 600 MG: 300 TABLET, EXTENDED RELEASE ORAL at 08:10

## 2023-10-06 NOTE — PROGRESS NOTES
BEHAVIORAL HEALTH SERVICE PROGRESS NOTE    Vishal Spangler 19 y.o. male MRN: [id number]  Unit/Bed#: [facility] 382-01 Encounter: [id number]         ASSESSMENT/PLAN     Vishal Spangler is a 19 y.o. male, who is admitted to [address] on 201 voluntary commitment for agitation and aggression. Currently, no anticipated medication changes. Will check blood work over the weekend. Principal Problem:    Mood disorder ([address])  Active Problems:    ADHD (attention deficit hyperactivity disorder)    Intermittent explosive disorder in adult    Autism spectrum disorder    Medical clearance for psychiatric admission    Obesity (BMI 35.0-39.9 without comorbidity)      RECOMMENDED TREATMENT     Change Lithobid to 600 mg daily, 900 mg HS for mood  Check Lithium level on 10/8/2023 2000  Continue current medications:  Buspar 15 mg BID for anxiety  Melatonin 3 mg HS for sleep  Risperdal 1 mg daily, 3 mg HS for psychosis/mood  Trazodone 50 mg HS for sleep  All current active medications have been reviewed  Encourage group therapy, milieu therapy and occupational therapy  Behavioral Health checks every 15 minutes    Risks / Benefits of Treatment: Risks, benefits, and possible side effects of medications explained to patient and patient verbalizes understanding and agreement for treatment. Counseling / Coordination of Care: Patient's progress discussed with staff in treatment team meeting. Medications, treatment progress and treatment plan reviewed with patient. Legal status: 201 voluntary commitment  Disposition: return to previous living arrangement, pending clinical stabilization         SUBJECTIVE     Over the last 24 hours:  Per nursing note: " The patient has been out of his room most of the shift. Denies SI, HI, AH and VH. Offers no complaints. In behavioral control.   "  PRN medications: none  Behavior over the last 24 hours: some improvement. The patient was seen and evaluated today for continuity of care.  On interview, the patient is sitting in the group room watching television. Patient reports "good" mood. Patient endorses "good" energy levels. Patient reports "good" sleep for total 8 hours. Per patient, appetite is "good". Patient is more calm and is pleasant. Currently, patient denies auditory hallucinations and denies visual hallucinations. The patient denies current passive or active suicidal ideation, intent, or plan. Patient denies current passive or active homicidal ideation, intent, or plan. Patient reports tolerating medications well and denies adverse effects at this time. Team discussed treatment plan, to which patient is amenable. Patient does not endorse additional questions or concerns at this time. Participates more appropriately in milieu. Socializes with peers.     Progress Toward Goals: progressing, less agitated, more cooperative, less paranoid    Review of Systems:  Sleep: normal  Appetite: normal  Medication side effects: No   ROS: reports cold symptoms and nasal congestion, denies any headache, shortness of breath or chest pain, all other systems are negative      OBJECTIVE     Vital signs in last 24 hours:   Temp:  [97.2 °F (36.2 °C)-98.2 °F (36.8 °C)] 97.2 °F (36.2 °C)  HR:  [] 95  Resp:  [16-18] 18  BP: (130-154)/(79-90) 154/79    Mental Status Exam:  Appearance: alert, appears stated age, casually dressed, marginal grooming/hygiene and obese  Behavior: pleasant, cooperative, calm  Motor: no abnormal movements  Gait/Station: normal gait/station  Speech: normal rate, normal volume, normal pitch  Mood: "good"  Affect: brighter  Thought process: goal directed, normal rate of thoughts  Associations: circumstantial associations  Thought content: mild paranoia  Perceptual disturbances: no auditory hallucinations, no visual hallucinations, denies auditory or visual hallucinations when asked, but appears distracted, does not appear responding to internal stimuli  Risk potential:   Suicidal ideation - None  Homicidal ideation - None  Potential for aggression - Not at present  Cognition: memory grossly intact and appears to be below average intelligence  Sensorium: oriented to person and place  Consciousness: alert and awake  Attention/Concentration: attention span and concentration appear shorter than expected for age  Insight: Limited  Judgment: Improving     Suicide/Homicide Risk Assessment:  Risk of Harm to Self:   Nursing Suicide Risk Assessment Last 24 hours: C-SSRS Risk (Since Last Contact)  Calculated C-SSRS Risk Score (Since Last Contact): No Risk Indicated  Current Specific Risk Factors include: diagnosis of mood disorder, mental illness diagnosis  Protective Factors: no current suicidal ideation, ability to communicate with staff on the unit, taking medications as ordered on the unit, responds to redirection  Based on today's assessment, Jodie Ordaz presents the following risk of harm to self: low  Risk of Harm to Others:  Nursing Homicide Risk Assessment: Violence Risk to Others: Yes- Within the last 6 months  Current Specific Risk Factors include: impaired cognition, diagnosis of mood disorder, multiple stressors, social difficulties  Protective Factors: no current homicidal ideation, mood is stabilizing, willing to continue psychiatric treatment  Based on today's assessment, Jodie Ordaz presents the following risk of harm to others: low  The following interventions are recommended: behavioral checks every 7 minutes, continued hospitalization on locked unit      Nutrition Assessment and Intervention:     Reviewed food recall journal      Emotional and Mental Well-being, Sleep, Connectedness Assessment and Intervention:    Sleep/stress assessment performed      Therapeutic Lifestyle Change Visit:     One-on-one comprehensive counseling, coaching, and health behavior change visit completed           ACTIVE MEDICATIONS     Current Medications:  Current Facility-Administered Medications   Medication Dose Route Frequency Provider Last Rate   • acetaminophen  650 mg Oral Q6H PRN Chelsea Gu MD     • acetaminophen  650 mg Oral Q4H PRN Chelsea Gu MD     • acetaminophen  975 mg Oral Q6H PRN Chelsea Gu MD     • aluminum-magnesium hydroxide-simethicone  30 mL Oral Q4H PRN Chelsea Gu MD     • benztropine  1 mg Intramuscular Q4H PRN Max 6/day Chelsea Gu MD     • benztropine  1 mg Oral Q4H PRN Max 6/day Chelsea Gu MD     • busPIRone  15 mg Oral BID Chelsea Gu MD     • cholecalciferol  2,000 Units Oral Daily Chelsea Gu MD     • hydrOXYzine HCL  50 mg Oral Q6H PRN Max 4/day Chelsea Gu MD      Or   • diphenhydrAMINE  50 mg Intramuscular Q6H PRN Chelsea Gu MD     • hydrOXYzine HCL  100 mg Oral Q6H PRN Max 4/day Chelsea Gu MD      Or   • LORazepam  2 mg Intramuscular Q6H PRN Chelsea Gu MD     • hydrOXYzine HCL  25 mg Oral Q6H PRN Max 4/day Chelsea Gu MD     • lithium carbonate  600 mg Oral Daily Elvin Way MD     • lithium carbonate  900 mg Oral QPM Elvin Way MD     • melatonin  3 mg Oral HS Chelsea Gu MD     • OLANZapine  10 mg Oral Q3H PRN Max 3/day Chelsea Gu MD      Or   • OLANZapine  10 mg Intramuscular Q3H PRN Max 3/day Chelsea Gu MD     • OLANZapine  5 mg Oral Q3H PRN Max 6/day Chelsea Gu MD      Or   • OLANZapine  5 mg Intramuscular Q3H PRN Max 6/day Chelsea Gu MD     • OLANZapine  2.5 mg Oral Q3H PRN Max 8/day Chelsea Gu MD     • propranolol  10 mg Oral Q8H PRN Chelsea Gu MD     • risperiDONE  1 mg Oral Daily Elvin Way MD     • risperiDONE  3 mg Oral HS Chelsea Gu MD     • senna-docusate sodium  1 tablet Oral Daily PRN Chelsea Gu MD     • traZODone  50 mg Oral HS Chelsea Gu MD     • traZODone  50 mg Oral HS PRN Chelsea Gu MD         Behavioral Health Medications: I have personally reviewed all current active medications.  Changes as in plan section above. ADDITIONAL DATA     EKG Results: reviewed  Results for orders placed or performed during the hospital encounter of 09/19/23 (from the past 1000 hour(s))   ECG 12 lead    Collection Time: 09/22/23  5:04 PM   Result Value    Ventricular Rate 86    Atrial Rate 86    DC Interval 142    QRSD Interval 96    QT Interval 348    QTC Interval 416    P Axis 36    QRS Axis 83    T Wave Axis 43    Narrative    Normal sinus rhythm  Normal ECG  No previous ECGs available  Confirmed by Kevin Ball ([id number]) on 9/25/2023 8:30:08 AM     *Note: Due to a large number of results and/or encounters for the requested time period, some results have not been displayed. A complete set of results can be found in Results Review. Radiology Results: Reviewed, no new imaging  No results found. No Chest XR results available for this patient. Laboratory Results: I have personally reviewed all pertinent laboratory/tests results  Results from the past 24 hours: No results found for this or any previous visit (from the past 24 hour(s)).       This note was not shared with the patient due to reasonable likelihood of causing patient harm        Claude Smack, MD 10/06/23  Department of Psychiatry and [facility]

## 2023-10-06 NOTE — NURSING NOTE
Denies SI/HI/SIB/AVH/Pain. Calm and in control. Cooperative. Med compliant. Remains visible and social. Wearing own clothes. Offers no complaints.

## 2023-10-06 NOTE — NURSING NOTE
The patient has been out of his room most of the shift. Denies SI, HI, AH and VH. Offers no complaints. In behavioral control.

## 2023-10-06 NOTE — PLAN OF CARE
Problem: Ineffective Coping  Goal: Identifies healthy coping skills  Outcome: Progressing  Goal: Free from restraint events  Description: - Utilize least restrictive measures   - Provide behavioral interventions   - Redirect inappropriate behaviors   Outcome: Progressing     Problem: Risk for Violence/Aggression Toward Others  Goal: Treatment Goal: Refrain from acts of violence/aggression during length of stay, and demonstrate improved impulse control at the time of discharge  Outcome: Progressing  Goal: Refrain from harming others  Outcome: Progressing     Problem: Ineffective Coping  Goal: Participates in unit activities  Description: Interventions:  - Provide therapeutic environment   - Provide required programming   - Redirect inappropriate behaviors   Outcome: Progressing     Problem: DISCHARGE PLANNING  Goal: Discharge to home or other facility with appropriate resources  Description: INTERVENTIONS:  - Identify barriers to discharge w/patient and caregiver  - Arrange for needed discharge resources and transportation as appropriate  - Identify discharge learning needs (meds, wound care, etc.)  - Arrange for interpretive services to assist at discharge as needed  - Refer to Case Management Department for coordinating discharge planning if the patient needs post-hospital services based on physician/advanced practitioner order or complex needs related to functional status, cognitive ability, or social support system  Outcome: Progressing     Problem: BEHAVIOR  Goal: Pt/Family maintain appropriate behavior and adhere to behavioral management agreement, if implemented  Description: INTERVENTIONS:  - Assess the family dynamic   - Encourage verbalization of thoughts and concerns in a socially appropriate manner  - Assess patient/family's coping skills and non-compliant behavior (including use of illegal substances).   - Utilize positive, consistent limit setting strategies supporting safety of patient, staff and others  - Initiate consult with Case Management, Spiritual Care or other ancillary services as appropriate  - If a patient's/visitor's behavior jeopardizes the safety of the patient, staff, or others, refer to organization procedure.    - Notify Security of behavior or suspected illegal substances which indicate the need for search of the patient and/or belongings  - Encourage participation in the decision making process about a behavioral management agreement; implement if patient meets criteria  Outcome: Progressing

## 2023-10-06 NOTE — PROGRESS NOTES
10/06/23 0820   Team Meeting   Meeting Type Daily Rounds   Team Members Present   Team Members Present Physician;Nurse;   Physician Team Member [facility]   Nursing Team Member Race   Care Management Team Member Daksha   Patient/Family Present   Patient Present No   Patient's Family Present No     Pt visible and agitated. Required staff redirections to calm him after discharge was cancelled yesterday. Visible and attends some groups. Pt compliant with meds and meals. Monitor Lithium level. Discharge to be determined.  Continue to monitor

## 2023-10-07 PROCEDURE — 99232 SBSQ HOSP IP/OBS MODERATE 35: CPT | Performed by: PSYCHIATRY & NEUROLOGY

## 2023-10-07 RX ORDER — TRAZODONE HYDROCHLORIDE 50 MG/1
50 TABLET ORAL
Status: DISCONTINUED | OUTPATIENT
Start: 2023-10-07 | End: 2023-10-09 | Stop reason: HOSPADM

## 2023-10-07 RX ORDER — LANOLIN ALCOHOL/MO/W.PET/CERES
3 CREAM (GRAM) TOPICAL
Status: DISCONTINUED | OUTPATIENT
Start: 2023-10-07 | End: 2023-10-09 | Stop reason: HOSPADM

## 2023-10-07 RX ADMIN — RISPERIDONE 1 MG: 1 TABLET, ORALLY DISINTEGRATING ORAL at 08:48

## 2023-10-07 RX ADMIN — BUSPIRONE HYDROCHLORIDE 15 MG: 10 TABLET ORAL at 17:08

## 2023-10-07 RX ADMIN — RISPERIDONE 3 MG: 3 TABLET, ORALLY DISINTEGRATING ORAL at 21:29

## 2023-10-07 RX ADMIN — LITHIUM CARBONATE 900 MG: 450 TABLET, EXTENDED RELEASE ORAL at 21:29

## 2023-10-07 RX ADMIN — CHOLECALCIFEROL TAB 25 MCG (1000 UNIT) 2000 UNITS: 25 TAB at 08:48

## 2023-10-07 RX ADMIN — BUSPIRONE HYDROCHLORIDE 15 MG: 10 TABLET ORAL at 08:48

## 2023-10-07 RX ADMIN — LITHIUM CARBONATE 600 MG: 300 TABLET, EXTENDED RELEASE ORAL at 08:48

## 2023-10-07 NOTE — PROGRESS NOTES
Progress Note - Behavioral Health   Thiago Chavez 19 y.o. male MRN: [id number]  Unit/Bed#: [facility] 382-01 Encounter: [id number]    Assessment/Plan   Principal Problem:    Mood disorder ([address])  Active Problems:    ADHD (attention deficit hyperactivity disorder)    Intermittent explosive disorder in adult    Autism spectrum disorder    Medical clearance for psychiatric admission    Obesity (BMI 35.0-39.9 without comorbidity)      Behavior over the last 24 hours:  unchanged  Sleep: normal  Appetite: normal  Medication side effects: No  ROS: no complaints        Mental Status Evaluation:  Appearance:  age appropriate and casually dressed   Behavior:  cooperative   Speech:  normal pitch and normal volume   Mood:  constricted   Affect:  constricted   Thought Process:  goal directed   Associations: circumstantial associations   Thought Content:  delusions  persecutory   Perceptual Disturbances: None   Risk Potential: Suicidal Ideations none  Homicidal Ideations none  Potential for Aggression No   Sensorium:  person, place and time/date   Memory:  recent and remote memory grossly intact   Consciousness:  alert and awake    Attention: attention span appeared shorter than expected for age   Insight:  limited   Judgment: limited   Gait/Station: normal gait/station and normal balance   Motor Activity: no abnormal movements     Progress Toward Goals: Patient remains compliant with medications and denies side effects. According to staff report he remains calm and in good behavior control. He is cooperative with care and visible on unit socializing with staff and peers. Due to daytime sedation refused Trazodone and Melatonin (scheduled bedtime dose). Changed both medications to qhs prn instead. Today he denies depression or anxiety symptoms. He denies  SI, HI, A/V hallucinations. Will continue current treatment. Recommended Treatment: Continue with group therapy, milieu therapy and occupational therapy.   Treatment plan, treatment progress and medication changes were reviewed with nursing staff. Observe progress over the weekend  Recheck Lithium level and BMP on 10/8/2023  Continue current medications. Risks, benefits and possible side effects of Medications:   Risks, benefits, and possible side effects of medications explained to patient and patient verbalizes understanding.       Medications:   all current active meds have been reviewed, continue current psychiatric medications and current meds:   Current Facility-Administered Medications   Medication Dose Route Frequency   • acetaminophen (TYLENOL) tablet 650 mg  650 mg Oral Q6H PRN   • acetaminophen (TYLENOL) tablet 650 mg  650 mg Oral Q4H PRN   • acetaminophen (TYLENOL) tablet 975 mg  975 mg Oral Q6H PRN   • aluminum-magnesium hydroxide-simethicone (MAALOX) oral suspension 30 mL  30 mL Oral Q4H PRN   • benztropine (COGENTIN) injection 1 mg  1 mg Intramuscular Q4H PRN Max 6/day   • benztropine (COGENTIN) tablet 1 mg  1 mg Oral Q4H PRN Max 6/day   • busPIRone (BUSPAR) tablet 15 mg  15 mg Oral BID   • cholecalciferol (VITAMIN D3) tablet 2,000 Units  2,000 Units Oral Daily   • hydrOXYzine HCL (ATARAX) tablet 50 mg  50 mg Oral Q6H PRN Max 4/day    Or   • diphenhydrAMINE (BENADRYL) injection 50 mg  50 mg Intramuscular Q6H PRN   • hydrOXYzine HCL (ATARAX) tablet 100 mg  100 mg Oral Q6H PRN Max 4/day    Or   • LORazepam (ATIVAN) injection 2 mg  2 mg Intramuscular Q6H PRN   • hydrOXYzine HCL (ATARAX) tablet 25 mg  25 mg Oral Q6H PRN Max 4/day   • lithium carbonate (LITHOBID) CR tablet 600 mg  600 mg Oral Daily   • lithium carbonate (LITHOBID) CR tablet 900 mg  900 mg Oral QPM   • melatonin tablet 3 mg  3 mg Oral HS PRN   • OLANZapine (ZyPREXA) tablet 10 mg  10 mg Oral Q3H PRN Max 3/day    Or   • OLANZapine (ZyPREXA) IM injection 10 mg  10 mg Intramuscular Q3H PRN Max 3/day   • OLANZapine (ZyPREXA) tablet 5 mg  5 mg Oral Q3H PRN Max 6/day    Or   • OLANZapine (ZyPREXA) IM injection 5 mg  5 mg Intramuscular Q3H PRN Max 6/day   • OLANZapine (ZyPREXA) tablet 2.5 mg  2.5 mg Oral Q3H PRN Max 8/day   • propranolol (INDERAL) tablet 10 mg  10 mg Oral Q8H PRN   • risperiDONE (RisperDAL M-TAB) disintegrating tablet 1 mg  1 mg Oral Daily   • risperiDONE (RisperDAL M-TAB) disintegrating tablet 3 mg  3 mg Oral HS   • senna-docusate sodium (SENOKOT S) 8.6-50 mg per tablet 1 tablet  1 tablet Oral Daily PRN   • traZODone (DESYREL) tablet 50 mg  50 mg Oral HS PRN   • traZODone (DESYREL) tablet 50 mg  50 mg Oral HS PRN   . Labs: I have personally reviewed all pertinent laboratory/tests results. Most Recent Labs:   Lab Results   Component Value Date    WBC 5.33 09/30/2023    RBC 5.52 09/30/2023    HGB 16.6 09/30/2023    HCT 48.1 09/30/2023     09/30/2023    RDW 11.9 09/30/2023    NEUTROABS 2.12 09/30/2023    SODIUM 138 10/05/2023    K 3.8 10/05/2023     10/05/2023    CO2 27 10/05/2023    BUN 11 10/05/2023    CREATININE 0.59 (L) 10/05/2023    GLUC 103 10/05/2023    GLUF 103 (H) 10/05/2023    CALCIUM 9.1 10/05/2023    AST 24 09/30/2023    ALT 41 09/30/2023    ALKPHOS 39 09/30/2023    TP 6.8 09/30/2023    ALB 4.2 09/30/2023    TBILI 0.39 09/30/2023    CHOLESTEROL 164 09/23/2023    HDL 42 09/23/2023    TRIG 120 09/23/2023    LDLCALC 98 09/23/2023    NONHDLC 122 09/23/2023    LITHIUM 0.5 (L) 10/05/2023    JPB5TLBBOIOB 0.766 09/23/2023    HGBA1C 5.3 09/23/2023     09/23/2023       Counseling / Coordination of Care  Total floor / unit time spent today n/a minutes. Greater than 50% of total time was spent with the patient and / or family counseling and / or coordination of care.  A description of the counseling / coordination of care:

## 2023-10-07 NOTE — NURSING NOTE
Was visible in the small tv room this evening watching television alone. Denied depression, anxiety, SI, HI, and hallucinations of any kind. Refused scheduled night time dose of trazodone 50 mg and melatonin 3 mg. Patient stated these medications must be the reason that they have been sleeping so much and were unhappy that the doctor prescribed them. This writer encouraged patient to further discuss with the doctor. Patient was compliant with the rest of their scheduled night time medications. Retreated to bed on their own without any issues.

## 2023-10-07 NOTE — PLAN OF CARE
Problem: Ineffective Coping  Goal: Identifies healthy coping skills  Outcome: Progressing  Goal: Free from restraint events  Description: - Utilize least restrictive measures   - Provide behavioral interventions   - Redirect inappropriate behaviors   Outcome: Progressing     Problem: Risk for Violence/Aggression Toward Others  Goal: Treatment Goal: Refrain from acts of violence/aggression during length of stay, and demonstrate improved impulse control at the time of discharge  Outcome: Progressing  Goal: Refrain from harming others  Outcome: Progressing     Problem: Ineffective Coping  Goal: Participates in unit activities  Description: Interventions:  - Provide therapeutic environment   - Provide required programming   - Redirect inappropriate behaviors   Outcome: Progressing     Problem: BEHAVIOR  Goal: Pt/Family maintain appropriate behavior and adhere to behavioral management agreement, if implemented  Description: INTERVENTIONS:  - Assess the family dynamic   - Encourage verbalization of thoughts and concerns in a socially appropriate manner  - Assess patient/family's coping skills and non-compliant behavior (including use of illegal substances). - Utilize positive, consistent limit setting strategies supporting safety of patient, staff and others  - Initiate consult with Case Management, Spiritual Care or other ancillary services as appropriate  - If a patient's/visitor's behavior jeopardizes the safety of the patient, staff, or others, refer to organization procedure.    - Notify Security of behavior or suspected illegal substances which indicate the need for search of the patient and/or belongings  - Encourage participation in the decision making process about a behavioral management agreement; implement if patient meets criteria  Outcome: Progressing

## 2023-10-07 NOTE — NURSING NOTE
Denies SI/HI/SIB/AVH/Pain. Calm and in control. Cooperative. Med compliant. Visible on unit. Wearing own clothes. Offers no complaints. Socializing with staff and peers.

## 2023-10-08 LAB
ANION GAP SERPL CALCULATED.3IONS-SCNC: 9 MMOL/L
BUN SERPL-MCNC: 15 MG/DL (ref 5–25)
CALCIUM SERPL-MCNC: 9.6 MG/DL (ref 8.4–10.2)
CHLORIDE SERPL-SCNC: 101 MMOL/L (ref 96–108)
CO2 SERPL-SCNC: 27 MMOL/L (ref 21–32)
CREAT SERPL-MCNC: 0.65 MG/DL (ref 0.6–1.3)
GFR SERPL CREATININE-BSD FRML MDRD: 140 ML/MIN/1.73SQ M
GLUCOSE SERPL-MCNC: 111 MG/DL (ref 65–140)
LITHIUM SERPL-SCNC: 0.5 MMOL/L (ref 0.6–1.2)
POTASSIUM SERPL-SCNC: 3.9 MMOL/L (ref 3.5–5.3)
SODIUM SERPL-SCNC: 137 MMOL/L (ref 135–147)

## 2023-10-08 PROCEDURE — 99232 SBSQ HOSP IP/OBS MODERATE 35: CPT | Performed by: PSYCHIATRY & NEUROLOGY

## 2023-10-08 PROCEDURE — 80178 ASSAY OF LITHIUM: CPT | Performed by: PSYCHIATRY & NEUROLOGY

## 2023-10-08 PROCEDURE — 80048 BASIC METABOLIC PNL TOTAL CA: CPT | Performed by: PSYCHIATRY & NEUROLOGY

## 2023-10-08 RX ADMIN — RISPERIDONE 1 MG: 1 TABLET, ORALLY DISINTEGRATING ORAL at 08:41

## 2023-10-08 RX ADMIN — RISPERIDONE 3 MG: 3 TABLET, ORALLY DISINTEGRATING ORAL at 21:32

## 2023-10-08 RX ADMIN — CHOLECALCIFEROL TAB 25 MCG (1000 UNIT) 2000 UNITS: 25 TAB at 08:41

## 2023-10-08 RX ADMIN — BUSPIRONE HYDROCHLORIDE 15 MG: 10 TABLET ORAL at 08:42

## 2023-10-08 RX ADMIN — LITHIUM CARBONATE 900 MG: 450 TABLET, EXTENDED RELEASE ORAL at 21:32

## 2023-10-08 RX ADMIN — BUSPIRONE HYDROCHLORIDE 15 MG: 10 TABLET ORAL at 17:12

## 2023-10-08 RX ADMIN — LITHIUM CARBONATE 600 MG: 300 TABLET, EXTENDED RELEASE ORAL at 08:40

## 2023-10-08 NOTE — NURSING NOTE
Denies SI/HI/SIB/AVH/Pain. Remains calm and in control. Med compliant. Visible on unit. Wearing own clothes. Offers no complaints.  Socializing with staff and peers

## 2023-10-08 NOTE — NURSING NOTE
Was visible out of their room this evening. Showered and stated they felt better after doing so. Denies depression, anxiety, SI, HI, and hallucinations of any kind. Medication compliant. Retreated to bed on their own without any issues.

## 2023-10-08 NOTE — PROGRESS NOTES
Progress Note - Behavioral Health   Supa Savage 19 y.o. male MRN: [id number]  Unit/Bed#: -01 Encounter: [id number]    Assessment/Plan   Principal Problem:    Mood disorder ([address])  Active Problems:    ADHD (attention deficit hyperactivity disorder)    Intermittent explosive disorder in adult    Autism spectrum disorder    Medical clearance for psychiatric admission    Obesity (BMI 35.0-39.9 without comorbidity)      Behavior over the last 24 hours:  unchanged  Sleep: normal  Appetite: normal  Medication side effects: No  ROS: no complaints        Mental Status Evaluation:  Appearance:  age appropriate and casually dressed   Behavior:  cooperative   Speech:  normal pitch and normal volume   Mood:  constricted   Affect:  constricted   Thought Process:  goal directed   Associations: circumstantial associations   Thought Content:  delusions  persecutory   Perceptual Disturbances: None   Risk Potential: Suicidal Ideations none  Homicidal Ideations none  Potential for Aggression No   Sensorium:  person, place and time/date   Memory:  recent and remote memory grossly intact   Consciousness:  alert and awake    Attention: attention span appeared shorter than expected for age   Insight:  limited   Judgment: limited   Gait/Station: normal gait/station and normal balance   Motor Activity: no abnormal movements     Progress Toward Goals: Patient remains compliant with medications and denies side effects. According to staff report he remains calm and in good behavior control. He is cooperative with care and visible on unit socializing with staff and peers. Today he denies depression or anxiety symptoms. He denies  SI, HI, A/V hallucinations. Labs ordered for this evening. Will continue current treatment. Recommended Treatment: Continue with group therapy, milieu therapy and occupational therapy. Treatment plan, treatment progress and medication changes were reviewed with nursing staff.   Observe progress over the weekend  Recheck Lithium level and BMP on 10/8/2023 evening  Continue current medications. Risks, benefits and possible side effects of Medications:   Risks, benefits, and possible side effects of medications explained to patient and patient verbalizes understanding.       Medications:   all current active meds have been reviewed, continue current psychiatric medications and current meds:   Current Facility-Administered Medications   Medication Dose Route Frequency   • acetaminophen (TYLENOL) tablet 650 mg  650 mg Oral Q6H PRN   • acetaminophen (TYLENOL) tablet 650 mg  650 mg Oral Q4H PRN   • acetaminophen (TYLENOL) tablet 975 mg  975 mg Oral Q6H PRN   • aluminum-magnesium hydroxide-simethicone (MAALOX) oral suspension 30 mL  30 mL Oral Q4H PRN   • benztropine (COGENTIN) injection 1 mg  1 mg Intramuscular Q4H PRN Max 6/day   • benztropine (COGENTIN) tablet 1 mg  1 mg Oral Q4H PRN Max 6/day   • busPIRone (BUSPAR) tablet 15 mg  15 mg Oral BID   • cholecalciferol (VITAMIN D3) tablet 2,000 Units  2,000 Units Oral Daily   • hydrOXYzine HCL (ATARAX) tablet 50 mg  50 mg Oral Q6H PRN Max 4/day    Or   • diphenhydrAMINE (BENADRYL) injection 50 mg  50 mg Intramuscular Q6H PRN   • hydrOXYzine HCL (ATARAX) tablet 100 mg  100 mg Oral Q6H PRN Max 4/day    Or   • LORazepam (ATIVAN) injection 2 mg  2 mg Intramuscular Q6H PRN   • hydrOXYzine HCL (ATARAX) tablet 25 mg  25 mg Oral Q6H PRN Max 4/day   • lithium carbonate (LITHOBID) CR tablet 600 mg  600 mg Oral Daily   • lithium carbonate (LITHOBID) CR tablet 900 mg  900 mg Oral QPM   • melatonin tablet 3 mg  3 mg Oral HS PRN   • OLANZapine (ZyPREXA) tablet 10 mg  10 mg Oral Q3H PRN Max 3/day    Or   • OLANZapine (ZyPREXA) IM injection 10 mg  10 mg Intramuscular Q3H PRN Max 3/day   • OLANZapine (ZyPREXA) tablet 5 mg  5 mg Oral Q3H PRN Max 6/day    Or   • OLANZapine (ZyPREXA) IM injection 5 mg  5 mg Intramuscular Q3H PRN Max 6/day   • OLANZapine (ZyPREXA) tablet 2.5 mg  2.5 mg Oral Q3H PRN Max 8/day   • propranolol (INDERAL) tablet 10 mg  10 mg Oral Q8H PRN   • risperiDONE (RisperDAL M-TAB) disintegrating tablet 1 mg  1 mg Oral Daily   • risperiDONE (RisperDAL M-TAB) disintegrating tablet 3 mg  3 mg Oral HS   • senna-docusate sodium (SENOKOT S) 8.6-50 mg per tablet 1 tablet  1 tablet Oral Daily PRN   • traZODone (DESYREL) tablet 50 mg  50 mg Oral HS PRN   • traZODone (DESYREL) tablet 50 mg  50 mg Oral HS PRN   . Labs: I have personally reviewed all pertinent laboratory/tests results. Most Recent Labs:   Lab Results   Component Value Date    WBC 5.33 09/30/2023    RBC 5.52 09/30/2023    HGB 16.6 09/30/2023    HCT 48.1 09/30/2023     09/30/2023    RDW 11.9 09/30/2023    NEUTROABS 2.12 09/30/2023    SODIUM 138 10/05/2023    K 3.8 10/05/2023     10/05/2023    CO2 27 10/05/2023    BUN 11 10/05/2023    CREATININE 0.59 (L) 10/05/2023    GLUC 103 10/05/2023    GLUF 103 (H) 10/05/2023    CALCIUM 9.1 10/05/2023    AST 24 09/30/2023    ALT 41 09/30/2023    ALKPHOS 39 09/30/2023    TP 6.8 09/30/2023    ALB 4.2 09/30/2023    TBILI 0.39 09/30/2023    CHOLESTEROL 164 09/23/2023    HDL 42 09/23/2023    TRIG 120 09/23/2023    LDLCALC 98 09/23/2023    NONHDLC 122 09/23/2023    LITHIUM 0.5 (L) 10/05/2023    FGG4KVOETLTG 0.766 09/23/2023    HGBA1C 5.3 09/23/2023     09/23/2023       Counseling / Coordination of Care  Total floor / unit time spent today n/a minutes. Greater than 50% of total time was spent with the patient and / or family counseling and / or coordination of care.  A description of the counseling / coordination of care:

## 2023-10-09 VITALS
RESPIRATION RATE: 16 BRPM | WEIGHT: 278 LBS | DIASTOLIC BLOOD PRESSURE: 74 MMHG | SYSTOLIC BLOOD PRESSURE: 129 MMHG | BODY MASS INDEX: 42.13 KG/M2 | HEIGHT: 68 IN | OXYGEN SATURATION: 97 % | TEMPERATURE: 97.4 F | HEART RATE: 92 BPM

## 2023-10-09 PROBLEM — Z00.8 MEDICAL CLEARANCE FOR PSYCHIATRIC ADMISSION: Status: RESOLVED | Noted: 2023-09-22 | Resolved: 2023-10-09

## 2023-10-09 PROCEDURE — 99238 HOSP IP/OBS DSCHRG MGMT 30/<: CPT | Performed by: PSYCHIATRY & NEUROLOGY

## 2023-10-09 RX ORDER — RISPERIDONE 1 MG/1
1 TABLET ORAL DAILY
Qty: 30 TABLET | Refills: 0 | Status: SHIPPED | OUTPATIENT
Start: 2023-10-10 | End: 2023-10-21

## 2023-10-09 RX ORDER — RISPERIDONE 1 MG/1
1 TABLET ORAL DAILY
Status: DISCONTINUED | OUTPATIENT
Start: 2023-10-10 | End: 2023-10-09 | Stop reason: HOSPADM

## 2023-10-09 RX ORDER — BUSPIRONE HYDROCHLORIDE 15 MG/1
15 TABLET ORAL 2 TIMES DAILY
Qty: 60 TABLET | Refills: 0 | Status: SHIPPED | OUTPATIENT
Start: 2023-10-09 | End: 2023-11-08

## 2023-10-09 RX ORDER — LITHIUM CARBONATE 450 MG
900 TABLET, EXTENDED RELEASE ORAL EVERY EVENING
Qty: 60 TABLET | Refills: 0 | Status: SHIPPED | OUTPATIENT
Start: 2023-10-09 | End: 2023-10-09 | Stop reason: SDUPTHER

## 2023-10-09 RX ORDER — LITHIUM CARBONATE 450 MG
900 TABLET, EXTENDED RELEASE ORAL EVERY EVENING
Qty: 60 TABLET | Refills: 0 | Status: SHIPPED | OUTPATIENT
Start: 2023-10-09 | End: 2023-11-08

## 2023-10-09 RX ORDER — RISPERIDONE 3 MG/1
3 TABLET ORAL
Qty: 30 TABLET | Refills: 0 | Status: SHIPPED | OUTPATIENT
Start: 2023-10-09 | End: 2023-11-08

## 2023-10-09 RX ORDER — RISPERIDONE 1 MG/1
1 TABLET ORAL DAILY
Qty: 30 TABLET | Refills: 0 | Status: SHIPPED | OUTPATIENT
Start: 2023-10-10 | End: 2023-10-09 | Stop reason: SDUPTHER

## 2023-10-09 RX ORDER — MELATONIN
2000 DAILY
Qty: 60 TABLET | Refills: 0 | Status: SHIPPED | OUTPATIENT
Start: 2023-10-10 | End: 2023-10-21

## 2023-10-09 RX ORDER — BUSPIRONE HYDROCHLORIDE 15 MG/1
15 TABLET ORAL 2 TIMES DAILY
Qty: 60 TABLET | Refills: 0 | Status: SHIPPED | OUTPATIENT
Start: 2023-10-09 | End: 2023-10-09 | Stop reason: SDUPTHER

## 2023-10-09 RX ORDER — RISPERIDONE 3 MG/1
3 TABLET ORAL
Qty: 30 TABLET | Refills: 0 | Status: SHIPPED | OUTPATIENT
Start: 2023-10-09 | End: 2023-10-09 | Stop reason: SDUPTHER

## 2023-10-09 RX ORDER — LITHIUM CARBONATE 300 MG/1
600 TABLET, FILM COATED, EXTENDED RELEASE ORAL DAILY
Qty: 60 TABLET | Refills: 0 | Status: SHIPPED | OUTPATIENT
Start: 2023-10-10 | End: 2023-10-09 | Stop reason: SDUPTHER

## 2023-10-09 RX ORDER — LITHIUM CARBONATE 300 MG/1
600 TABLET, FILM COATED, EXTENDED RELEASE ORAL DAILY
Qty: 60 TABLET | Refills: 0 | Status: SHIPPED | OUTPATIENT
Start: 2023-10-10 | End: 2023-10-21

## 2023-10-09 RX ORDER — MELATONIN
2000 DAILY
Qty: 60 TABLET | Refills: 0 | Status: SHIPPED | OUTPATIENT
Start: 2023-10-10 | End: 2023-10-09 | Stop reason: SDUPTHER

## 2023-10-09 RX ADMIN — CHOLECALCIFEROL TAB 25 MCG (1000 UNIT) 2000 UNITS: 25 TAB at 08:17

## 2023-10-09 RX ADMIN — LITHIUM CARBONATE 600 MG: 300 TABLET, EXTENDED RELEASE ORAL at 08:16

## 2023-10-09 RX ADMIN — BUSPIRONE HYDROCHLORIDE 15 MG: 10 TABLET ORAL at 08:17

## 2023-10-09 RX ADMIN — RISPERIDONE 1 MG: 1 TABLET, ORALLY DISINTEGRATING ORAL at 08:17

## 2023-10-09 NOTE — NURSING NOTE
Pt alert and oriented. Observed in milieu and dayroom. Medication and meal compliant. Pt denies SI/HI/AH/VH, pain, depression, and anxiety. Pt is able to make needs known. Pt attended group today and is social with peers. Pt is wearing his own clothes. Pt expresses excitement for being discharged today.

## 2023-10-09 NOTE — PROGRESS NOTES
10/09/23 0824   Team Meeting   Meeting Type Daily Rounds   Team Members Present   Team Members Present Physician;Nurse;   Physician Team Member Ijeoma Pelayo   Nursing Team Member SYLVIA AMOR [facility] Management Team Member Daksha   Patient/Family Present   Patient Present No   Patient's Family Present No     Pt visible, social and compliant with meds and meals. Denies symptoms. Meds be sent to [facility]. D/c coordination meeting with [facility] today.  D/c plan today at 2 PM.

## 2023-10-09 NOTE — PLAN OF CARE
Problem: Ineffective Coping  Goal: Identifies healthy coping skills  Outcome: Completed  Goal: Free from restraint events  Description: - Utilize least restrictive measures   - Provide behavioral interventions   - Redirect inappropriate behaviors   Outcome: Completed     Problem: DISCHARGE PLANNING  Goal: Discharge to home or other facility with appropriate resources  Description: INTERVENTIONS:  - Identify barriers to discharge w/patient and caregiver  - Arrange for needed discharge resources and transportation as appropriate  - Identify discharge learning needs (meds, wound care, etc.)  - Arrange for interpretive services to assist at discharge as needed  - Refer to Case Management Department for coordinating discharge planning if the patient needs post-hospital services based on physician/advanced practitioner order or complex needs related to functional status, cognitive ability, or social support system  Outcome: Completed     Problem: BEHAVIOR  Goal: Pt/Family maintain appropriate behavior and adhere to behavioral management agreement, if implemented  Description: INTERVENTIONS:  - Assess the family dynamic   - Encourage verbalization of thoughts and concerns in a socially appropriate manner  - Assess patient/family's coping skills and non-compliant behavior (including use of illegal substances). - Utilize positive, consistent limit setting strategies supporting safety of patient, staff and others  - Initiate consult with Case Management, Spiritual Care or other ancillary services as appropriate  - If a patient's/visitor's behavior jeopardizes the safety of the patient, staff, or others, refer to organization procedure.    - Notify Security of behavior or suspected illegal substances which indicate the need for search of the patient and/or belongings  - Encourage participation in the decision making process about a behavioral management agreement; implement if patient meets criteria  Outcome: Completed Problem: Risk for Violence/Aggression Toward Others  Goal: Treatment Goal: Refrain from acts of violence/aggression during length of stay, and demonstrate improved impulse control at the time of discharge  Outcome: Completed  Goal: Refrain from harming others  Outcome: Completed     Problem: Ineffective Coping  Goal: Participates in unit activities  Description: Interventions:  - Provide therapeutic environment   - Provide required programming   - Redirect inappropriate behaviors   Outcome: Completed

## 2023-10-09 NOTE — NURSING NOTE
Reviewed AVS and medications with patient. Patient expressed verbal understanding. Patient left with belongings and blue folder with discharge instructions. Patient denied all symptoms. Patient looking forward to discharge. Pt walked down to lobby with staff member and discharged to staff member from group home via private vehicle at [phone].

## 2023-10-09 NOTE — CASE MANAGEMENT
Discharge coordination meeting was held virtually attended by the [facility] staff -Indigo Hodge and others, [facility]  Vance Buckley and Renda Cranker. Pt attended the meeting. Writer provided brief on status, improvement and current meds. Advised on pt's discharge plan - follow up appointment with [facility]. Pt's meds have been ePrescribed to [facility]. Writer clarified questions from participants about discharge planning. Pt participated and answered questions to the [facility] and [facility] staff members. Pt shared learned coping skills and plans to use them when returns to [facility]. Pt plans to comply with outpatient appointment and also follow the probation rules. Pt denies SI,HI and AVH. Indigo Hodge from [facility] advised that their staff o pick patient today at 2 PM. Pt will return to prior residence -[facility] at [address] [place] [address] to coordinate.
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
## 2023-10-09 NOTE — NURSING NOTE
Patient was out of their room most of the evening. Requested to play [employer] and did so with a peer in the dayroom. Denied depression, anxiety, SI, HI, and hallucinations of any kind. Compliant with evening lab work and scheduled night time medications. Able to verbalize needs throughout the evening and denies any unmet needs or concerns at this time.

## 2023-10-09 NOTE — CASE MANAGEMENT
Sent (3)  To Contents Status Sensitive?       --   [employer]  Fax AVS - Discharge to Home Sent by Paolo BUENROSTRO on 10/9/2023 3:03 PM           [address]  Fax - Primary Summary of Care - for Follow Up Providers Only Sent by Paolo BUENROSTRO on 10/9/2023 3:03 PM           [address]  Fax

## 2023-10-09 NOTE — BH TRANSITION RECORD
Contact Information: If you have any questions, concerns, pended studies, tests and/or procedures, or emergencies regarding your inpatient behavioral health visit. Please contact [facility] [phone] and ask to speak to a , nurse or physician. A contact is available 24 hours/ 7 days a week at this number. Summary of Procedures Performed During your Stay:  Below is a list of major procedures performed during your hospital stay and a summary of results:  - Cardiac Procedures/Studies: 9/22/23 EKG. 86 bpm, NSR, normal ECG    Pending Studies (From admission, onward)     Start     Ordered    10/09/23 0000  Lithium level         10/09/23 1017    10/09/23 3447  Basic metabolic panel        Comments: This is a patient instruction: Patient fasting for 8 hours or longer recommended. 10/09/23 1017              Please follow up on the above pending studies with your PCP and/or referring provider.

## 2023-10-20 ENCOUNTER — HOSPITAL ENCOUNTER (EMERGENCY)
Facility: HOSPITAL | Age: 18
Discharge: HOME | End: 2023-10-20
Attending: EMERGENCY MEDICINE
Payer: COMMERCIAL

## 2023-10-20 ENCOUNTER — APPOINTMENT (EMERGENCY)
Dept: RADIOLOGY | Facility: HOSPITAL | Age: 18
End: 2023-10-20
Payer: COMMERCIAL

## 2023-10-20 VITALS
WEIGHT: 288.8 LBS | OXYGEN SATURATION: 96 % | HEART RATE: 103 BPM | SYSTOLIC BLOOD PRESSURE: 154 MMHG | BODY MASS INDEX: 43.91 KG/M2 | RESPIRATION RATE: 20 BRPM | DIASTOLIC BLOOD PRESSURE: 91 MMHG | TEMPERATURE: 98.5 F

## 2023-10-20 DIAGNOSIS — M54.50 ACUTE BILATERAL LOW BACK PAIN WITHOUT SCIATICA: Primary | ICD-10-CM

## 2023-10-20 PROCEDURE — 99284 EMERGENCY DEPT VISIT MOD MDM: CPT

## 2023-10-20 PROCEDURE — 99283 EMERGENCY DEPT VISIT LOW MDM: CPT

## 2023-10-20 PROCEDURE — 72100 X-RAY EXAM L-S SPINE 2/3 VWS: CPT

## 2023-10-20 RX ORDER — NAPROXEN 375 MG/1
375 TABLET ORAL 2 TIMES DAILY WITH MEALS
Qty: 20 TABLET | Refills: 0 | Status: SHIPPED | OUTPATIENT
Start: 2023-10-20 | End: 2023-10-21

## 2023-10-20 RX ORDER — NAPROXEN 375 MG/1
375 TABLET ORAL ONCE
Status: COMPLETED | OUTPATIENT
Start: 2023-10-20 | End: 2023-10-20

## 2023-10-20 RX ORDER — METHOCARBAMOL 500 MG/1
500 TABLET, FILM COATED ORAL 2 TIMES DAILY
Qty: 20 TABLET | Refills: 0 | Status: SHIPPED | OUTPATIENT
Start: 2023-10-20 | End: 2023-10-31

## 2023-10-20 RX ORDER — METHOCARBAMOL 500 MG/1
500 TABLET, FILM COATED ORAL ONCE
Status: COMPLETED | OUTPATIENT
Start: 2023-10-20 | End: 2023-10-20

## 2023-10-20 RX ADMIN — METHOCARBAMOL TABLETS 500 MG: 500 TABLET, COATED ORAL at 20:34

## 2023-10-20 RX ADMIN — NAPROXEN 375 MG: 375 TABLET ORAL at 20:34

## 2023-10-21 ENCOUNTER — HOSPITAL ENCOUNTER (EMERGENCY)
Facility: HOSPITAL | Age: 18
End: 2023-10-23
Attending: EMERGENCY MEDICINE | Admitting: EMERGENCY MEDICINE
Payer: COMMERCIAL

## 2023-10-21 DIAGNOSIS — Z00.8 MEDICAL CLEARANCE FOR PSYCHIATRIC ADMISSION: ICD-10-CM

## 2023-10-21 DIAGNOSIS — R45.1 AGITATION: Primary | ICD-10-CM

## 2023-10-21 PROCEDURE — 80307 DRUG TEST PRSMV CHEM ANLYZR: CPT | Performed by: EMERGENCY MEDICINE

## 2023-10-21 PROCEDURE — 99283 EMERGENCY DEPT VISIT LOW MDM: CPT

## 2023-10-21 PROCEDURE — 82075 ASSAY OF BREATH ETHANOL: CPT | Performed by: EMERGENCY MEDICINE

## 2023-10-21 PROCEDURE — 99285 EMERGENCY DEPT VISIT HI MDM: CPT | Performed by: EMERGENCY MEDICINE

## 2023-10-21 RX ORDER — RISPERIDONE 1 MG/1
3 TABLET ORAL
Status: DISCONTINUED | OUTPATIENT
Start: 2023-10-21 | End: 2023-10-23 | Stop reason: HOSPADM

## 2023-10-21 RX ORDER — LITHIUM CARBONATE 450 MG
900 TABLET, EXTENDED RELEASE ORAL EVERY EVENING
Status: DISCONTINUED | OUTPATIENT
Start: 2023-10-21 | End: 2023-10-23 | Stop reason: HOSPADM

## 2023-10-21 RX ORDER — METHOCARBAMOL 500 MG/1
500 TABLET, FILM COATED ORAL 2 TIMES DAILY
Status: DISCONTINUED | OUTPATIENT
Start: 2023-10-21 | End: 2023-10-23 | Stop reason: HOSPADM

## 2023-10-21 RX ADMIN — RISPERIDONE 3 MG: 1 TABLET ORAL at 22:00

## 2023-10-21 RX ADMIN — LITHIUM CARBONATE 900 MG: 450 TABLET ORAL at 23:15

## 2023-10-21 RX ADMIN — METHOCARBAMOL 500 MG: 500 TABLET ORAL at 22:00

## 2023-10-21 RX ADMIN — BUSPIRONE HYDROCHLORIDE 15 MG: 10 TABLET ORAL at 22:00

## 2023-10-21 NOTE — ED NOTES
Patient was provided a drink. At this point patient asked for his phone to be charged. Patient was advised that his phone could be charged at the desk once it , but at 8pm the phone would be secured in his locker for the night. Patient was in agreement with this.      Jatin Johnson RN  10/21/23 1932

## 2023-10-21 NOTE — ED NOTES
RN assumes care of the patient. He is lying in bed on his personal cell phone. Patient was allowed use of his personal cell phone by previous RN staff. No obvious signs of distress observed.       Jatin Johnson RN  10/21/23 1920
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
## 2023-10-22 RX ADMIN — METHOCARBAMOL 500 MG: 500 TABLET ORAL at 08:14

## 2023-10-22 RX ADMIN — METHOCARBAMOL 500 MG: 500 TABLET ORAL at 21:05

## 2023-10-22 RX ADMIN — LITHIUM CARBONATE 900 MG: 450 TABLET ORAL at 17:50

## 2023-10-22 RX ADMIN — RISPERIDONE 3 MG: 1 TABLET ORAL at 21:05

## 2023-10-22 RX ADMIN — BUSPIRONE HYDROCHLORIDE 15 MG: 10 TABLET ORAL at 08:14

## 2023-10-22 RX ADMIN — BUSPIRONE HYDROCHLORIDE 15 MG: 10 TABLET ORAL at 17:07

## 2023-10-22 NOTE — ED NOTES
Insurance Authorization for admission:   Phone call placed to [employer]. Phone number: [phone]. Spoke to [facility] days approved. Level of care: Inpatient Behavioral Health (148). Review on **. Authorization # To be given upon admission. Eligibility Verification System checked - ([phone]). Online system / automated system indicates:     [address] [facility]        10/22/2023      10/22/2023        [address]   10/22/2023      10/22/2023                   Insurance Authorization for Transportation:    Phone call placed to **. Phone number **. Spoke to **.    Authorization #: **
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
## 2023-10-22 NOTE — ED NOTES
RN assumed care of patient at this time. Patient resting in bed with no outward signs of distress.       Pancho Mcqueen RN  10/22/23 9973

## 2023-10-22 NOTE — ED NOTES
[facility]:    ID# [id number]    [address] [facility] 10/22/2023 10/22/2023   [facility] 10/22/2023 10/22/2023

## 2023-10-22 NOTE — ED NOTES
Assumed care of patient at this time patient in room sleeping, patient respiration equal and unlabored, patient appears to be in no distress, patient on q15 safety checks on paper     Lucina Stock RN  10/21/23 6946

## 2023-10-23 ENCOUNTER — HOSPITAL ENCOUNTER (INPATIENT)
Facility: HOSPITAL | Age: 18
LOS: 8 days | Discharge: HOME | DRG: 753 | End: 2023-10-31
Attending: STUDENT IN AN ORGANIZED HEALTH CARE EDUCATION/TRAINING PROGRAM | Admitting: STUDENT IN AN ORGANIZED HEALTH CARE EDUCATION/TRAINING PROGRAM
Payer: COMMERCIAL

## 2023-10-23 VITALS
TEMPERATURE: 98.2 F | RESPIRATION RATE: 16 BRPM | HEART RATE: 90 BPM | SYSTOLIC BLOOD PRESSURE: 137 MMHG | OXYGEN SATURATION: 98 % | DIASTOLIC BLOOD PRESSURE: 85 MMHG

## 2023-10-23 DIAGNOSIS — M54.9 BACK PAIN: ICD-10-CM

## 2023-10-23 DIAGNOSIS — F41.9 ANXIETY: ICD-10-CM

## 2023-10-23 DIAGNOSIS — F39 MOOD DISORDER (HCC): Primary | ICD-10-CM

## 2023-10-23 DIAGNOSIS — Z00.8 MEDICAL CLEARANCE FOR PSYCHIATRIC ADMISSION: ICD-10-CM

## 2023-10-23 PROCEDURE — NC001 PR NO CHARGE: Performed by: EMERGENCY MEDICINE

## 2023-10-23 PROCEDURE — GZ59ZZZ INDIVIDUAL PSYCHOTHERAPY, PSYCHOPHYSIOLOGICAL: ICD-10-PCS | Performed by: PSYCHIATRY & NEUROLOGY

## 2023-10-23 PROCEDURE — GZHZZZZ GROUP PSYCHOTHERAPY: ICD-10-PCS | Performed by: PSYCHIATRY & NEUROLOGY

## 2023-10-23 RX ORDER — BISACODYL 10 MG
10 SUPPOSITORY, RECTAL RECTAL DAILY PRN
Status: DISCONTINUED | OUTPATIENT
Start: 2023-10-23 | End: 2023-10-31 | Stop reason: HOSPADM

## 2023-10-23 RX ORDER — AMOXICILLIN 250 MG
1 CAPSULE ORAL DAILY PRN
Status: DISCONTINUED | OUTPATIENT
Start: 2023-10-23 | End: 2023-10-31 | Stop reason: HOSPADM

## 2023-10-23 RX ORDER — HYDROXYZINE 50 MG/1
50 TABLET, FILM COATED ORAL
Status: DISCONTINUED | OUTPATIENT
Start: 2023-10-23 | End: 2023-10-31 | Stop reason: HOSPADM

## 2023-10-23 RX ORDER — HYDROXYZINE HYDROCHLORIDE 25 MG/1
25 TABLET, FILM COATED ORAL
Status: DISCONTINUED | OUTPATIENT
Start: 2023-10-23 | End: 2023-10-31 | Stop reason: HOSPADM

## 2023-10-23 RX ORDER — LORAZEPAM 2 MG/ML
2 INJECTION INTRAMUSCULAR
Status: CANCELLED | OUTPATIENT
Start: 2023-10-23

## 2023-10-23 RX ORDER — LORAZEPAM 2 MG/ML
1 INJECTION INTRAMUSCULAR
Status: DISCONTINUED | OUTPATIENT
Start: 2023-10-23 | End: 2023-10-31 | Stop reason: HOSPADM

## 2023-10-23 RX ORDER — LORAZEPAM 2 MG/ML
1 INJECTION INTRAMUSCULAR
Status: CANCELLED | OUTPATIENT
Start: 2023-10-23

## 2023-10-23 RX ORDER — BENZTROPINE MESYLATE 1 MG/1
1 TABLET ORAL 2 TIMES DAILY PRN
Status: CANCELLED | OUTPATIENT
Start: 2023-10-23

## 2023-10-23 RX ORDER — HALOPERIDOL 1 MG/1
2 TABLET ORAL
Status: CANCELLED | OUTPATIENT
Start: 2023-10-23

## 2023-10-23 RX ORDER — HALOPERIDOL 5 MG/ML
5 INJECTION INTRAMUSCULAR
Status: CANCELLED | OUTPATIENT
Start: 2023-10-23

## 2023-10-23 RX ORDER — MINERAL OIL AND PETROLATUM 150; 830 MG/G; MG/G
OINTMENT OPHTHALMIC 4 TIMES DAILY PRN
Status: CANCELLED | OUTPATIENT
Start: 2023-10-23

## 2023-10-23 RX ORDER — MINERAL OIL AND PETROLATUM 150; 830 MG/G; MG/G
OINTMENT OPHTHALMIC 4 TIMES DAILY PRN
Status: DISCONTINUED | OUTPATIENT
Start: 2023-10-23 | End: 2023-10-31 | Stop reason: HOSPADM

## 2023-10-23 RX ORDER — ACETAMINOPHEN 325 MG/1
650 TABLET ORAL EVERY 4 HOURS PRN
Status: DISCONTINUED | OUTPATIENT
Start: 2023-10-23 | End: 2023-10-31 | Stop reason: HOSPADM

## 2023-10-23 RX ORDER — MAGNESIUM HYDROXIDE/ALUMINUM HYDROXICE/SIMETHICONE 120; 1200; 1200 MG/30ML; MG/30ML; MG/30ML
30 SUSPENSION ORAL EVERY 4 HOURS PRN
Status: DISCONTINUED | OUTPATIENT
Start: 2023-10-23 | End: 2023-10-31 | Stop reason: HOSPADM

## 2023-10-23 RX ORDER — BENZTROPINE MESYLATE 1 MG/ML
1 INJECTION INTRAMUSCULAR; INTRAVENOUS
Status: CANCELLED | OUTPATIENT
Start: 2023-10-23

## 2023-10-23 RX ORDER — TRAZODONE HYDROCHLORIDE 50 MG/1
50 TABLET ORAL
Status: CANCELLED | OUTPATIENT
Start: 2023-10-23

## 2023-10-23 RX ORDER — ACETAMINOPHEN 325 MG/1
975 TABLET ORAL EVERY 6 HOURS PRN
Status: CANCELLED | OUTPATIENT
Start: 2023-10-23

## 2023-10-23 RX ORDER — METHOCARBAMOL 500 MG/1
500 TABLET, FILM COATED ORAL 2 TIMES DAILY
Status: CANCELLED | OUTPATIENT
Start: 2023-10-23

## 2023-10-23 RX ORDER — MAGNESIUM HYDROXIDE/ALUMINUM HYDROXICE/SIMETHICONE 120; 1200; 1200 MG/30ML; MG/30ML; MG/30ML
30 SUSPENSION ORAL EVERY 4 HOURS PRN
Status: CANCELLED | OUTPATIENT
Start: 2023-10-23

## 2023-10-23 RX ORDER — ACETAMINOPHEN 325 MG/1
975 TABLET ORAL EVERY 6 HOURS PRN
Status: DISCONTINUED | OUTPATIENT
Start: 2023-10-23 | End: 2023-10-31 | Stop reason: HOSPADM

## 2023-10-23 RX ORDER — AMOXICILLIN 250 MG
1 CAPSULE ORAL DAILY PRN
Status: CANCELLED | OUTPATIENT
Start: 2023-10-23

## 2023-10-23 RX ORDER — BUSPIRONE HYDROCHLORIDE 15 MG/1
15 TABLET ORAL 2 TIMES DAILY
Status: DISCONTINUED | OUTPATIENT
Start: 2023-10-24 | End: 2023-10-31 | Stop reason: HOSPADM

## 2023-10-23 RX ORDER — LITHIUM CARBONATE 450 MG
900 TABLET, EXTENDED RELEASE ORAL EVERY EVENING
Status: CANCELLED | OUTPATIENT
Start: 2023-10-23

## 2023-10-23 RX ORDER — HYDROXYZINE HYDROCHLORIDE 25 MG/1
25 TABLET, FILM COATED ORAL
Status: CANCELLED | OUTPATIENT
Start: 2023-10-23

## 2023-10-23 RX ORDER — ACETAMINOPHEN 325 MG/1
650 TABLET ORAL EVERY 6 HOURS PRN
Status: CANCELLED | OUTPATIENT
Start: 2023-10-23

## 2023-10-23 RX ORDER — BENZTROPINE MESYLATE 1 MG/ML
1 INJECTION INTRAMUSCULAR; INTRAVENOUS 2 TIMES DAILY PRN
Status: CANCELLED | OUTPATIENT
Start: 2023-10-23

## 2023-10-23 RX ORDER — HALOPERIDOL 5 MG/1
5 TABLET ORAL
Status: CANCELLED | OUTPATIENT
Start: 2023-10-23

## 2023-10-23 RX ORDER — HALOPERIDOL 5 MG/ML
2.5 INJECTION INTRAMUSCULAR
Status: DISCONTINUED | OUTPATIENT
Start: 2023-10-23 | End: 2023-10-31 | Stop reason: HOSPADM

## 2023-10-23 RX ORDER — LORAZEPAM 2 MG/ML
1 INJECTION INTRAMUSCULAR EVERY 4 HOURS PRN
Status: DISCONTINUED | OUTPATIENT
Start: 2023-10-23 | End: 2023-10-31 | Stop reason: HOSPADM

## 2023-10-23 RX ORDER — RISPERIDONE 1 MG/1
3 TABLET ORAL
Status: CANCELLED | OUTPATIENT
Start: 2023-10-23

## 2023-10-23 RX ORDER — LORAZEPAM 2 MG/ML
2 INJECTION INTRAMUSCULAR
Status: DISCONTINUED | OUTPATIENT
Start: 2023-10-23 | End: 2023-10-31 | Stop reason: HOSPADM

## 2023-10-23 RX ORDER — BENZTROPINE MESYLATE 1 MG/ML
0.5 INJECTION INTRAMUSCULAR; INTRAVENOUS
Status: DISCONTINUED | OUTPATIENT
Start: 2023-10-23 | End: 2023-10-31 | Stop reason: HOSPADM

## 2023-10-23 RX ORDER — HALOPERIDOL 5 MG/ML
2.5 INJECTION INTRAMUSCULAR
Status: CANCELLED | OUTPATIENT
Start: 2023-10-23

## 2023-10-23 RX ORDER — LITHIUM CARBONATE 450 MG
900 TABLET, EXTENDED RELEASE ORAL EVERY EVENING
Status: DISCONTINUED | OUTPATIENT
Start: 2023-10-24 | End: 2023-10-31 | Stop reason: HOSPADM

## 2023-10-23 RX ORDER — HALOPERIDOL 5 MG/ML
5 INJECTION INTRAMUSCULAR
Status: DISCONTINUED | OUTPATIENT
Start: 2023-10-23 | End: 2023-10-31 | Stop reason: HOSPADM

## 2023-10-23 RX ORDER — BENZTROPINE MESYLATE 1 MG/ML
0.5 INJECTION INTRAMUSCULAR; INTRAVENOUS
Status: CANCELLED | OUTPATIENT
Start: 2023-10-23

## 2023-10-23 RX ORDER — ACETAMINOPHEN 325 MG/1
650 TABLET ORAL EVERY 6 HOURS PRN
Status: DISCONTINUED | OUTPATIENT
Start: 2023-10-23 | End: 2023-10-31 | Stop reason: HOSPADM

## 2023-10-23 RX ORDER — HALOPERIDOL 5 MG/1
5 TABLET ORAL
Status: DISCONTINUED | OUTPATIENT
Start: 2023-10-23 | End: 2023-10-31 | Stop reason: HOSPADM

## 2023-10-23 RX ORDER — METHOCARBAMOL 500 MG/1
500 TABLET, FILM COATED ORAL 2 TIMES DAILY
Status: DISCONTINUED | OUTPATIENT
Start: 2023-10-24 | End: 2023-10-31 | Stop reason: HOSPADM

## 2023-10-23 RX ORDER — HALOPERIDOL 2 MG/1
2 TABLET ORAL
Status: DISCONTINUED | OUTPATIENT
Start: 2023-10-23 | End: 2023-10-31 | Stop reason: HOSPADM

## 2023-10-23 RX ORDER — LORAZEPAM 1 MG/1
1 TABLET ORAL
Status: DISCONTINUED | OUTPATIENT
Start: 2023-10-23 | End: 2023-10-31 | Stop reason: HOSPADM

## 2023-10-23 RX ORDER — LORAZEPAM 2 MG/ML
1 INJECTION INTRAMUSCULAR EVERY 4 HOURS PRN
Status: CANCELLED | OUTPATIENT
Start: 2023-10-23

## 2023-10-23 RX ORDER — BENZTROPINE MESYLATE 1 MG/ML
1 INJECTION INTRAMUSCULAR; INTRAVENOUS
Status: DISCONTINUED | OUTPATIENT
Start: 2023-10-23 | End: 2023-10-31 | Stop reason: HOSPADM

## 2023-10-23 RX ORDER — LORAZEPAM 1 MG/1
1 TABLET ORAL
Status: CANCELLED | OUTPATIENT
Start: 2023-10-23

## 2023-10-23 RX ORDER — TRAZODONE HYDROCHLORIDE 50 MG/1
50 TABLET ORAL
Status: DISCONTINUED | OUTPATIENT
Start: 2023-10-23 | End: 2023-10-31 | Stop reason: HOSPADM

## 2023-10-23 RX ORDER — HYDROXYZINE HYDROCHLORIDE 25 MG/1
50 TABLET, FILM COATED ORAL
Status: CANCELLED | OUTPATIENT
Start: 2023-10-23

## 2023-10-23 RX ORDER — BISACODYL 10 MG
10 SUPPOSITORY, RECTAL RECTAL DAILY PRN
Status: CANCELLED | OUTPATIENT
Start: 2023-10-23

## 2023-10-23 RX ORDER — BENZTROPINE MESYLATE 1 MG/1
1 TABLET ORAL 2 TIMES DAILY PRN
Status: DISCONTINUED | OUTPATIENT
Start: 2023-10-23 | End: 2023-10-31 | Stop reason: HOSPADM

## 2023-10-23 RX ORDER — ACETAMINOPHEN 325 MG/1
650 TABLET ORAL EVERY 4 HOURS PRN
Status: CANCELLED | OUTPATIENT
Start: 2023-10-23

## 2023-10-23 RX ORDER — BENZTROPINE MESYLATE 1 MG/ML
1 INJECTION INTRAMUSCULAR; INTRAVENOUS 2 TIMES DAILY PRN
Status: DISCONTINUED | OUTPATIENT
Start: 2023-10-23 | End: 2023-10-31 | Stop reason: HOSPADM

## 2023-10-23 RX ADMIN — METHOCARBAMOL 500 MG: 500 TABLET ORAL at 20:00

## 2023-10-23 RX ADMIN — METHOCARBAMOL 500 MG: 500 TABLET ORAL at 09:56

## 2023-10-23 RX ADMIN — BUSPIRONE HYDROCHLORIDE 15 MG: 10 TABLET ORAL at 09:56

## 2023-10-23 RX ADMIN — BUSPIRONE HYDROCHLORIDE 15 MG: 10 TABLET ORAL at 19:07

## 2023-10-23 RX ADMIN — LITHIUM CARBONATE 900 MG: 450 TABLET ORAL at 19:09

## 2023-10-23 NOTE — ED NOTES
Transport packet complete and placed on patient chart.     Guadalupe Rich  Crisis Intervention Specialist II  10/23/23

## 2023-10-23 NOTE — ED NOTES
Assumed care of pt at this time. Pt resting comfortably in room. Pt respirations relaxed and unlabored. Pt is on a Q 15 minute safety check which is being documented on ED behavior health Observation record.      Sade Ferrsi RN  10/23/23 6454

## 2023-10-23 NOTE — EMTALA/ACUTE CARE TRANSFER
[address] [place] [id number]  Dept: [phone]      EMTALA TRANSFER CONSENT    NAME Jason Anna                                         2005                              MRN [id number]    I have been informed of my rights regarding examination, treatment, and transfer   by Dr. Amilcar Gabriel DO    Benefits: Specialized equipment and/or services available at the receiving facility (Include comment)________________________ (inpt psych)    Risks: Potential for delay in receiving treatment, Potential deterioration of medical condition, Increased discomfort during transfer, Possible worsening of condition or death during transfer      Consent for Transfer:  I acknowledge that my medical condition has been evaluated and explained to me by the emergency department physician or other qualified medical person and/or my attending physician, who has recommended that I be transferred to the service of  Accepting Physician: Dr. Rozina Delraosa at [address] [place] [address] [address] Name, [address] : Cosme KNOX. The above potential benefits of such transfer, the potential risks associated with such transfer, and the probable risks of not being transferred have been explained to me, and I fully understand them. The doctor has explained that, in my case, the benefits of transfer outweigh the risks. I agree to be transferred. I authorize the performance of emergency medical procedures and treatments upon me in both transit and upon arrival at the receiving facility. Additionally, I authorize the release of any and all medical records to the receiving facility and request they be transported with me, if possible. I understand that the safest mode of transportation during a medical emergency is an ambulance and that the Hospital advocates the use of this mode of transport.  Risks of traveling to the receiving facility by car, including absence of medical control, life sustaining equipment, such as oxygen, and medical personnel has been explained to me and I fully understand them. (MARIA ELENA CORRECT BOX BELOW)  [  ]  I consent to the stated transfer and to be transported by ambulance/helicopter. [  ]  I consent to the stated transfer, but refuse transportation by ambulance and accept full responsibility for my transportation by car. I understand the risks of non-ambulance transfers and I exonerate the Hospital and its staff from any deterioration in my condition that results from this refusal.    X___________________________________________    DATE  10/23/23  TIME________  Signature of patient or legally responsible individual signing on patient behalf           RELATIONSHIP TO PATIENT_________________________          Provider Certification    NAME Wesly Johnson                                        [facility] 2005                              MRN [id number]    A medical screening exam was performed on the above named patient. Based on the examination:    Condition Necessitating Transfer The primary encounter diagnosis was Agitation. A diagnosis of Medical clearance for psychiatric admission was also pertinent to this visit.     Patient Condition: The patient has been stabilized such that within reasonable medical probability, no material deterioration of the patient condition or the condition of the unborn child(trista) is likely to result from the transfer    Reason for Transfer: Level of Care needed not available at this facility (inpt psych)    [facility]   Space available and qualified personnel available for treatment as acknowledged by Adrianna Olson [phone]  Agreed to accept transfer and to provide appropriate medical treatment as acknowledged by       Dr. Yari Tierney  Appropriate medical records of the examination and treatment of the patient are provided at the time of transfer   [address] _______  Transfer will be performed by qualified personnel from [facility]  and appropriate transfer equipment as required, including the use of necessary and appropriate life support measures. Provider Certification: I have examined the patient and explained the following risks and benefits of being transferred/refusing transfer to the patient/family:  General risk, such as traffic hazards, adverse weather conditions, rough terrain or turbulence, possible failure of equipment (including vehicle or aircraft), or consequences of actions of persons outside the control of the transport personnel      Based on these reasonable risks and benefits to the patient and/or the unborn child(trista), and based upon the information available at the time of the patient’s examination, I certify that the medical benefits reasonably to be expected from the provision of appropriate medical treatments at another medical facility outweigh the increasing risks, if any, to the individual’s medical condition, and in the case of labor to the unborn child, from effecting the transfer.     X____________________________________________ DATE 10/23/23        TIME_______      ORIGINAL - SEND TO MEDICAL RECORDS   COPY - SEND WITH PATIENT DURING TRANSFER

## 2023-10-23 NOTE — ED CARE HANDOFF
Emergency Department Sign Out Note        Sign out and transfer of care from Dr. Samuel Zendejas. See Separate Emergency Department note. The patient, Thiago Chavez, was evaluated by the previous provider for agitation. Workup Completed:  Medical clearance, crisis evaluation. Pt signed 201. ED Course / Workup Pending (followup): No acute events overnight, pt resting comfortable. Pending placement. ED Course as of 10/23/23 1525   Mon Oct 23, 2023   1355 Pt accepted at [facility], transport at 9 pm this evening by ROBERTO VARGAS completed     Procedures  Medical Decision Making  Amount and/or Complexity of Data Reviewed  Labs: ordered. Risk  Decision regarding hospitalization. Disposition  Final diagnoses:   Agitation     Time reflects when diagnosis was documented in both MDM as applicable and the Disposition within this note       Time User Action Codes Description Comment    10/21/2023  5:23 PM Jaylan Sol Add [R45.1] Agitation     10/23/2023 12:42 PM Katarzyna Stone Add [Z00.8] Medical clearance for psychiatric admission           ED Disposition       ED Disposition   Transfer to [address] [place] [address]   --    Date/Time   Sat Oct 21, 2023  5:23 PM    Comment   Thiago Chavez should be transferred out to [facility] and has been medically cleared.                MD Documentation      Brian Van Most Recent Value   Patient Condition The patient has been stabilized such that within reasonable medical probability, no material deterioration of the patient condition or the condition of the unborn child(trista) is likely to result from the transfer   Reason for Transfer Level of Care needed not available at this facility  [inpt psych]   Benefits of Transfer Specialized equipment and/or services available at the receiving facility (Include comment)________________________  [inpt psych]   Risks of Transfer Potential for delay in receiving treatment, Potential deterioration of medical condition, Increased discomfort during transfer, Possible worsening of condition or death during transfer   Accepting Physician Dr. Haroldo Lucero Name, [address] [facility]    (Name & Tel number) Ailin Obando [phone]   Transported by (Company and Unit #) Olivia Mcclendon MD [facility]   Provider Certification General risk, such as traffic hazards, adverse weather conditions, rough terrain or turbulence, possible failure of equipment (including vehicle or aircraft), or consequences of actions of persons outside the control of the transport personnel          RN Documentation      Flowsheet Row Most [facility] Name, AL[place]    (Name & Tel number) Ailin Obando [phone]   Transported by (Company and Unit #) [employer]          Follow-up Information    None       Patient's Medications   Discharge Prescriptions    No medications on file     No discharge procedures on file.        ED Provider  Electronically Signed by     Law Esteves DO  10/23/23 2977

## 2023-10-23 NOTE — ED NOTES
0400 assessment and VS deferred pt is currently sleeping equal rise and fall of the chest.      Otis Dubose RN  10/23/23 6489

## 2023-10-23 NOTE — ED NOTES
[facility] contacted Elpidio, pt's  and informed him of accepting facility and transport time.   Elpidio was also provided phone number to [facility] per his request.

## 2023-10-23 NOTE — ED NOTES
Assumed care of pt at this time. Pt sitting on stretcher respirations even and unlabored. Group home staff at bedside. Pt on Q 15 min checks documented on ED behavioral health documentation record.        Durrell Essex, RN  10/23/23 5672

## 2023-10-23 NOTE — ED NOTES
Patient is accepted at [address]  Patient is accepted by Dr. Bob Johnson) per Belem Thompson    Transportation is arranged with [employer] is scheduled for 9pm  Patient may go to the floor after 2100    Nurse report is to be called to [phone] prior to patient transfer.

## 2023-10-23 NOTE — ED NOTES
0000 assessment and VS deferred pt is currently sleeping equal rise and fall of the chest.      Belva Homans, RN  10/23/23 2143

## 2023-10-23 NOTE — ED NOTES
Bed search:    201 and facesheet faxed to Intake  [facility]-Per Ewa No beds  [place] Per Flory Heath will review 201 and chart faxed  Shyla-No answer  [facility]-Per Paulie Cano no beds  Lara Gloss No beds  [facility]-Per Nina no beds  [facility]-Per Yoseph Morrell will review 201 and chart faxed

## 2023-10-24 PROBLEM — Z79.899 MEDICAL MARIJUANA USE: Status: ACTIVE | Noted: 2023-10-24

## 2023-10-24 LAB
25(OH)D3 SERPL-MCNC: 25 NG/ML (ref 30–100)
ALBUMIN SERPL BCP-MCNC: 4.4 G/DL (ref 3.5–5)
ALP SERPL-CCNC: 41 U/L (ref 34–104)
ALT SERPL W P-5'-P-CCNC: 51 U/L (ref 7–52)
ANION GAP SERPL CALCULATED.3IONS-SCNC: 9 MMOL/L
AST SERPL W P-5'-P-CCNC: 32 U/L (ref 13–39)
BASOPHILS # BLD AUTO: 0.05 THOUSANDS/ÂΜL (ref 0–0.1)
BASOPHILS NFR BLD AUTO: 1 % (ref 0–1)
BILIRUB SERPL-MCNC: 0.69 MG/DL (ref 0.2–1)
BUN SERPL-MCNC: 18 MG/DL (ref 5–25)
CALCIUM SERPL-MCNC: 9.6 MG/DL (ref 8.4–10.2)
CHLORIDE SERPL-SCNC: 102 MMOL/L (ref 96–108)
CHOLEST SERPL-MCNC: 202 MG/DL
CO2 SERPL-SCNC: 26 MMOL/L (ref 21–32)
CREAT SERPL-MCNC: 0.68 MG/DL (ref 0.6–1.3)
EOSINOPHIL # BLD AUTO: 0.38 THOUSAND/ÂΜL (ref 0–0.61)
EOSINOPHIL NFR BLD AUTO: 5 % (ref 0–6)
ERYTHROCYTE [DISTWIDTH] IN BLOOD BY AUTOMATED COUNT: 12.4 % (ref 11.6–15.1)
EST. AVERAGE GLUCOSE BLD GHB EST-MCNC: 97 MG/DL
GFR SERPL CREATININE-BSD FRML MDRD: 137 ML/MIN/1.73SQ M
GLUCOSE P FAST SERPL-MCNC: 99 MG/DL (ref 65–99)
GLUCOSE SERPL-MCNC: 99 MG/DL (ref 65–140)
HBA1C MFR BLD: 5 %
HCT VFR BLD AUTO: 47.7 % (ref 36.5–49.3)
HDLC SERPL-MCNC: 48 MG/DL
HGB BLD-MCNC: 16.3 G/DL (ref 12–17)
IMM GRANULOCYTES # BLD AUTO: 0.03 THOUSAND/UL (ref 0–0.2)
IMM GRANULOCYTES NFR BLD AUTO: 0 % (ref 0–2)
LDLC SERPL CALC-MCNC: 131 MG/DL (ref 0–100)
LITHIUM SERPL-SCNC: 0.5 MMOL/L (ref 0.6–1.2)
LYMPHOCYTES # BLD AUTO: 2.55 THOUSANDS/ÂΜL (ref 0.6–4.47)
LYMPHOCYTES NFR BLD AUTO: 34 % (ref 14–44)
MCH RBC QN AUTO: 30.2 PG (ref 26.8–34.3)
MCHC RBC AUTO-ENTMCNC: 34.2 G/DL (ref 31.4–37.4)
MCV RBC AUTO: 89 FL (ref 82–98)
MONOCYTES # BLD AUTO: 0.64 THOUSAND/ÂΜL (ref 0.17–1.22)
MONOCYTES NFR BLD AUTO: 9 % (ref 4–12)
NEUTROPHILS # BLD AUTO: 3.76 THOUSANDS/ÂΜL (ref 1.85–7.62)
NEUTS SEG NFR BLD AUTO: 51 % (ref 43–75)
NONHDLC SERPL-MCNC: 154 MG/DL
NRBC BLD AUTO-RTO: 0 /100 WBCS
PLATELET # BLD AUTO: 324 THOUSANDS/UL (ref 149–390)
PMV BLD AUTO: 9.3 FL (ref 8.9–12.7)
POTASSIUM SERPL-SCNC: 4 MMOL/L (ref 3.5–5.3)
PROT SERPL-MCNC: 7.6 G/DL (ref 6.4–8.4)
RBC # BLD AUTO: 5.39 MILLION/UL (ref 3.88–5.62)
SODIUM SERPL-SCNC: 137 MMOL/L (ref 135–147)
TREPONEMA PALLIDUM IGG+IGM AB [PRESENCE] IN SERUM OR PLASMA BY IMMUNOASSAY: NORMAL
TRIGL SERPL-MCNC: 114 MG/DL
TSH SERPL DL<=0.05 MIU/L-ACNC: 2.8 UIU/ML (ref 0.45–4.5)
WBC # BLD AUTO: 7.41 THOUSAND/UL (ref 4.31–10.16)

## 2023-10-24 PROCEDURE — 80053 COMPREHEN METABOLIC PANEL: CPT | Performed by: PHYSICIAN ASSISTANT

## 2023-10-24 PROCEDURE — 82306 VITAMIN D 25 HYDROXY: CPT | Performed by: PHYSICIAN ASSISTANT

## 2023-10-24 PROCEDURE — 99253 IP/OBS CNSLTJ NEW/EST LOW 45: CPT

## 2023-10-24 PROCEDURE — 85025 COMPLETE CBC W/AUTO DIFF WBC: CPT | Performed by: PHYSICIAN ASSISTANT

## 2023-10-24 PROCEDURE — 84443 ASSAY THYROID STIM HORMONE: CPT | Performed by: PHYSICIAN ASSISTANT

## 2023-10-24 PROCEDURE — 99223 1ST HOSP IP/OBS HIGH 75: CPT | Performed by: PSYCHIATRY & NEUROLOGY

## 2023-10-24 PROCEDURE — 80178 ASSAY OF LITHIUM: CPT | Performed by: PHYSICIAN ASSISTANT

## 2023-10-24 PROCEDURE — 83036 HEMOGLOBIN GLYCOSYLATED A1C: CPT | Performed by: PHYSICIAN ASSISTANT

## 2023-10-24 PROCEDURE — 86780 TREPONEMA PALLIDUM: CPT | Performed by: PHYSICIAN ASSISTANT

## 2023-10-24 PROCEDURE — 80061 LIPID PANEL: CPT | Performed by: PHYSICIAN ASSISTANT

## 2023-10-24 RX ADMIN — BUSPIRONE HYDROCHLORIDE 15 MG: 15 TABLET ORAL at 08:23

## 2023-10-24 RX ADMIN — BUSPIRONE HYDROCHLORIDE 15 MG: 15 TABLET ORAL at 17:42

## 2023-10-24 RX ADMIN — LITHIUM CARBONATE 900 MG: 450 TABLET, EXTENDED RELEASE ORAL at 17:42

## 2023-10-24 RX ADMIN — METHOCARBAMOL 500 MG: 500 TABLET ORAL at 08:23

## 2023-10-24 RX ADMIN — METHOCARBAMOL 500 MG: 500 TABLET ORAL at 21:10

## 2023-10-24 RX ADMIN — RISPERIDONE 3 MG: 2 TABLET ORAL at 21:10

## 2023-10-24 NOTE — PROGRESS NOTES
10/24/23 1100   Team Meeting   Meeting Type Tx Team Meeting   Initial Conference Date 10/24/23   Next Conference Date 11/21/23   Team Members Present   Team Members Present Physician;Nurse;   Physician Team Member Dr. Susana Peter Team Member [facility] Management Team Member Marcos Polo   Patient/Family Present   Patient Present No  (Pt declined to meet with treatment team.)   Patient's Family Present No       Reviewed diagnosis of Mood disorder. Discussed short term goals of decrease in level of agitation. No discharge date set at this time. All parties are in agreement and treatment plan was signed.
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
## 2023-10-24 NOTE — NURSING NOTE
Belongings delivered to unit for pt:    Cellphone [employer] (no case)  Sneakers DC  T-shirt "Slytherin"  Socks black  Sweatpants grey    NO WALLET, KEYS, ADDITIONAL VALUABLES @ THIS TIME

## 2023-10-24 NOTE — CMS CERTIFICATION NOTE
Recertification: Based upon physical, mental and social evaluations, I certify that inpatient psychiatric services continue to be medically necessary for this patient for a duration of 7 midnights for the treatment of  Mood disorder [facility]) Available alternative community resources still do not meet the patient's mental health care needs. I further attest that an established written individualized plan of care has been updated and is outlined in the patient's medical records.
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
## 2023-10-24 NOTE — NURSING NOTE
Spoke with [facility]-ED nurse. Nurse calling  for belonging (wallet, cellphone, and clothing) left in ED.

## 2023-10-24 NOTE — TREATMENT PLAN
TREATMENT PLAN REVIEW - Behavioral Health Kehinde Goldberg 19 y.o. 2005 male MRN: [id number]    [address] Room / Bed: [address] [address] Encounter: [id number]          Admit Date/Time:  10/23/2023  9:44 PM    Treatment Team: Attending Provider: Britt Starkey MD; Patient Care Assistant: Destin Stokes;  Patient Care Assistant: Kyleigh Hennessy; Charge Nurse: Tone Puente RN; Registered Nurse: Wesly Moe RN; Patient Care Technician: Emile Canales; Security: Marvel Lob; Patient Care Assistant: Andres Draper; Medications RN: Ellen Azevedo RN; Registered Nurse: Mary Lima RN; Care Manager: Audrey Mazariegos RN; : Parveen Sanders    Diagnosis: Principal Problem:    Mood disorder [facility])  Active Problems:    ADHD (attention deficit hyperactivity disorder)    Intermittent explosive disorder in adult    Autism spectrum disorder    Medical marijuana use      Patient Strengths/Assets: good physical health    Patient Barriers/Limitations: poor past treatment response    Short Term Goals: decrease in level of agitation    Long Term Goals: improved insight, able to express basic needs    Progress Towards Goals: starting psychiatric medications as prescribed, continue psychiatric medications as prescribed, improving gradually    Recommended Treatment: medication management, patient medication education, group therapy, milieu therapy, continued Behavioral Health psychiatric evaluation/assessment process    Treatment Frequency: daily medication monitoring, group and milieu therapy daily, monitoring through interdisciplinary rounds, monitoring through weekly patient care conferences    Expected Discharge Date:  7 days    Discharge Plan: placement in group home    Treatment Plan Created/Updated By: Jf Madden DO

## 2023-10-24 NOTE — ASSESSMENT & PLAN NOTE
Admission labs reviewed, CBC, CMP, lipid panel, TSH, UA acceptable  Lithium level low  Vitals stable   UDS negative  EKG pending  Medically stable for continued inpatient psychiatric treatment based on available results
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
## 2023-10-24 NOTE — PLAN OF CARE
Problem: DISCHARGE PLANNING  Goal: Discharge to home or other facility with appropriate resources  Description: INTERVENTIONS:  - Identify barriers to discharge w/patient and caregiver  - Arrange for needed discharge resources and transportation as appropriate  - Identify discharge learning needs (meds, wound care, etc.)  - Arrange for interpretive services to assist at discharge as needed  - Refer to Case Management Department for coordinating discharge planning if the patient needs post-hospital services based on physician/advanced practitioner order or complex needs related to functional status, cognitive ability, or social support system  Outcome: Progressing  Note: Pt met with CM to review and sign ROIs and complete intake. Pt read and signed treatment plan.
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
## 2023-10-25 PROCEDURE — 99232 SBSQ HOSP IP/OBS MODERATE 35: CPT | Performed by: PSYCHIATRY & NEUROLOGY

## 2023-10-25 RX ADMIN — RISPERIDONE 3 MG: 2 TABLET ORAL at 21:13

## 2023-10-25 RX ADMIN — LITHIUM CARBONATE 900 MG: 450 TABLET, EXTENDED RELEASE ORAL at 17:28

## 2023-10-25 RX ADMIN — METHOCARBAMOL 500 MG: 500 TABLET ORAL at 21:13

## 2023-10-25 RX ADMIN — BUSPIRONE HYDROCHLORIDE 15 MG: 15 TABLET ORAL at 08:34

## 2023-10-25 RX ADMIN — BUSPIRONE HYDROCHLORIDE 15 MG: 15 TABLET ORAL at 17:28

## 2023-10-25 RX ADMIN — METHOCARBAMOL 500 MG: 500 TABLET ORAL at 08:34

## 2023-10-25 NOTE — CASE MANAGEMENT
CM met with Pt to discuss discharge planning. CM stated that they had talked with the [facility] and they stated that they will take him back but he will have to be in a place by himself to start and then they can look into getting him a roommate later once he is there a while. CM stated that it was stated that if he doesn't want to go to the [facility], the  [facility] will come and pick him up and he will return to shelter. Pt stated that he expected this to happen because this is what they do. Pt discussed how the staff don't do anything with him and just sit there and play on his phones and then make up lies about him. Pt stated that he doesn't have any money to do anything so he is bored. Pt stated that they are supposed to be getting him his SSI checks but they haven't. Pt stated that he stated that he wants to work but they have also been restricting him on that as well. Pt stated that his sister and some friends of his are supposed to be looking into getting him his own place so he can leave the [facility]. Pt stated that he was set up with them once he was released from shelter. CM stated that they will talk with the [facility] to get updates on these issues that he has mentioned. CM stated that they can suggest he go to a day program to help keep him busy throughout the day. Pt stated that he would be interested in that. Pt stated that he will return to the [facility] but he will be trying to find his own place eventually. Pt stated that he is sure they will take a long time for him to get a place as well. CM stated that they will call and get an update for him. CM called Marilee through [facility] to inform her of the conversation with the Pt. Charissa Crisostomo stated that they did speak with the Pt and he stated he is willing to return. Charissa Crisostomo stated that his SSI was previously through his mother and they have been working to get it through [employer] which takes sometime.  Charissa Crisostomo stated that they have been giving him a $20 allowance for the week as well as a PS4 to play to keep him busy. Marian Alvin stated that they have looked into employment services and he was approved by [employer] but they have a wait list to begin working with someone. CM suggested looking into a day program. Marian Esquivel stated that they haven't been able to get one for him yet. CM suggested [facility]. CM provided phone and stated that they can call and confirm whether they can take him or not. Marian Alvin stated that he will have different staff when he returns to them. Orjosé antonioarabella Alvin stated that they are just getting everything in place for him for when he is discharged. CM stated that they will provide updates.

## 2023-10-25 NOTE — PROGRESS NOTES
Progress Note - Behavioral Health   Vince Holder 19 y.o. male MRN: [id number]  Unit/Bed#: [facility] 202-02 Encounter: [id number]    Assessment:  Principal Problem:    Mood disorder ([address])  Active Problems:    ADHD (attention deficit hyperactivity disorder)    Intermittent explosive disorder in adult    Autism spectrum disorder    Medical clearance for psychiatric admission    Medical marijuana use      Plan:  --Continue with psychiatric hospitalization  --Continue with individual, group, and milieu therapy  --Continue the following medications:  Current Facility-Administered Medications   Medication Dose Route Frequency    acetaminophen (TYLENOL) tablet 650 mg  650 mg Oral Q6H PRN    acetaminophen (TYLENOL) tablet 650 mg  650 mg Oral Q4H PRN    acetaminophen (TYLENOL) tablet 975 mg  975 mg Oral Q6H PRN    aluminum-magnesium hydroxide-simethicone (MAALOX) oral suspension 30 mL  30 mL Oral Q4H PRN    artificial tear (LUBRIFRESH P.M.) ophthalmic ointment   Both Eyes 4x Daily PRN    haloperidol lactate (HALDOL) injection 2.5 mg  2.5 mg Intramuscular Q6H PRN Max 4/day    And    LORazepam (ATIVAN) injection 1 mg  1 mg Intramuscular Q6H PRN Max 4/day    And    benztropine (COGENTIN) injection 0.5 mg  0.5 mg Intramuscular Q6H PRN Max 4/day    benztropine (COGENTIN) injection 1 mg  1 mg Intramuscular BID PRN    haloperidol lactate (HALDOL) injection 5 mg  5 mg Intramuscular Q4H PRN Max 4/day    And    LORazepam (ATIVAN) injection 2 mg  2 mg Intramuscular Q4H PRN Max 4/day    And    benztropine (COGENTIN) injection 1 mg  1 mg Intramuscular Q4H PRN Max 4/day    benztropine (COGENTIN) tablet 1 mg  1 mg Oral BID PRN    bisacodyl (DULCOLAX) rectal suppository 10 mg  10 mg Rectal Daily PRN    busPIRone (BUSPAR) tablet 15 mg  15 mg Oral BID    haloperidol (HALDOL) tablet 2 mg  2 mg Oral Q4H PRN Max 6/day    haloperidol (HALDOL) tablet 5 mg  5 mg Oral Q6H PRN Max 4/day    haloperidol (HALDOL) tablet 5 mg  5 mg Oral Q4H PRN Max 4/day hydrOXYzine HCL (ATARAX) tablet 25 mg  25 mg Oral Q6H PRN Max 4/day    hydrOXYzine HCL (ATARAX) tablet 50 mg  50 mg Oral Q4H PRN Max 4/day    Or    LORazepam (ATIVAN) injection 1 mg  1 mg Intramuscular Q4H PRN    lithium carbonate (LITHOBID) CR tablet 900 mg  900 mg Oral QPM    LORazepam (ATIVAN) tablet 1 mg  1 mg Oral Q4H PRN Max 6/day    Or    LORazepam (ATIVAN) injection 2 mg  2 mg Intramuscular Q6H PRN Max 3/day    magnesium hydroxide (MILK OF MAGNESIA) oral suspension 30 mL  30 mL Oral Daily PRN    methocarbamol (ROBAXIN) tablet 500 mg  500 mg Oral BID    risperiDONE (RisperDAL) tablet 3 mg  3 mg Oral HS    senna-docusate sodium (SENOKOT S) 8.6-50 mg per tablet 1 tablet  1 tablet Oral Daily PRN    traZODone (DESYREL) tablet 50 mg  50 mg Oral HS PRN       Subjective: Patient was seen for continuation of care. Chart was reviewed and discussed with treatment team.     No acute behavioral events over the past 24 hours. Today, patient was seen and examined at bedside for continuation of care. Today, patient states he is feeling "tired." He thought nursing was dosing his medications along with cocaine, and had to be educated that this was of course not happening. Patient is concrete, a bit impulsive, and childish, but is thus far receptive to redirection when needed. He is tolerating his medications and has been under generally good behavioral control. We discussed plan for him to meet with CM to discuss options for return to [facility]. Patient denied adverse effects to their psychiatric medication regimen. Patient denied other new or worsening psychiatric symptoms/complaints at this time. Discussed the importance of continuing to take medications as prescribed, as well as the importance of continuing to attend groups on the unit.      Psychiatric Review of Systems:  Medication adverse effects: none  Sleep: unchanged  Appetite: unchanged  Behavior over the past 24 hours: as per above    Vitals:  Vitals:    10/25/23 0734   BP: (!) 87/54   Pulse: 75   Resp: 18   Temp: 97.7 °F (36.5 °C)   SpO2:        Laboratory results:    I have personally reviewed all pertinent laboratory/tests results  Recent Results (from the past 48 hour(s))   Comprehensive metabolic panel    Collection Time: 10/24/23  7:06 AM   Result Value Ref Range    Sodium 137 135 - 147 mmol/L    Potassium 4.0 3.5 - 5.3 mmol/L    Chloride 102 96 - 108 mmol/L    CO2 26 21 - 32 mmol/L    ANION GAP 9 mmol/L    BUN 18 5 - 25 mg/dL    Creatinine 0.68 0.60 - 1.30 mg/dL    Glucose 99 65 - 140 mg/dL    Glucose, Fasting 99 65 - 99 mg/dL    Calcium 9.6 8.4 - 10.2 mg/dL    AST 32 13 - 39 U/L    ALT 51 7 - 52 U/L    Alkaline Phosphatase 41 34 - 104 U/L    Total Protein 7.6 6.4 - 8.4 g/dL    Albumin 4.4 3.5 - 5.0 g/dL    Total Bilirubin 0.69 0.20 - 1.00 mg/dL    eGFR 137 ml/min/1.73sq m   CBC and differential    Collection Time: 10/24/23  7:06 AM   Result Value Ref Range    WBC 7.41 4.31 - 10.16 Thousand/uL    RBC 5.39 3.88 - 5.62 Million/uL    Hemoglobin 16.3 12.0 - 17.0 g/dL    Hematocrit 47.7 36.5 - 49.3 %    MCV 89 82 - 98 fL    MCH 30.2 26.8 - 34.3 pg    MCHC 34.2 31.4 - 37.4 g/dL    RDW 12.4 11.6 - 15.1 %    MPV 9.3 8.9 - 12.7 fL    Platelets 152 614 - 374 Thousands/uL    nRBC 0 /100 WBCs    Neutrophils Relative 51 43 - 75 %    Immat GRANS % 0 0 - 2 %    Lymphocytes Relative 34 14 - 44 %    Monocytes Relative 9 4 - 12 %    Eosinophils Relative 5 0 - 6 %    Basophils Relative 1 0 - 1 %    Neutrophils Absolute 3.76 1.85 - 7.62 Thousands/µL    Immature Grans Absolute 0.03 0.00 - 0.20 Thousand/uL    Lymphocytes Absolute 2.55 0.60 - 4.47 Thousands/µL    Monocytes Absolute 0.64 0.17 - 1.22 Thousand/µL    Eosinophils Absolute 0.38 0.00 - 0.61 Thousand/µL    Basophils Absolute 0.05 0.00 - 0.10 Thousands/µL   Lipid panel    Collection Time: 10/24/23  7:06 AM   Result Value Ref Range    Cholesterol 202 (H) See Comment mg/dL    Triglycerides 114 See Comment mg/dL    HDL, Direct 48 >=40 mg/dL    LDL Calculated 131 (H) 0 - 100 mg/dL    Non-HDL-Chol (CHOL-HDL) 154 mg/dl   TSH, 3rd generation with Free T4 reflex    Collection Time: 10/24/23  7:06 AM   Result Value Ref Range    TSH 3RD GENERATON 2.800 0.450 - 4.500 uIU/mL   Hemoglobin A1C    Collection Time: 10/24/23  7:06 AM   Result Value Ref Range    Hemoglobin A1C 5.0 Normal 4.0-5.6%; PreDiabetic 5.7-6.4%; Diabetic >=6.5%; Glycemic control for adults with diabetes <7.0% %    EAG 97 mg/dl   RPR-Syphilis Screening (Total Syphilis IGG/IGM)    Collection Time: 10/24/23  7:06 AM   Result Value Ref Range    Syphilis Total Antibody Non-reactive Non-Reactive   Vitamin D 25 hydroxy    Collection Time: 10/24/23  7:06 AM   Result Value Ref Range    Vit D, 25-Hydroxy 25.0 (L) 30.0 - 100.0 ng/mL   Lithium level    Collection Time: 10/24/23  7:06 AM   Result Value Ref Range    Lithium Lvl 0.5 (L) 0.6 - 1.2 mmol/L        Current Medications:  Current medications as per above. All medications have been reviewed. Risks, benefits, alternatives, and possible side effects of patient's psychiatric medications were discussed with patient. Mental Status Evaluation:  Appearance: moderately kempt  Motor: no psychomotor disturbances  Behavior: superficially cooperative  Speech: mostly normal with brief periods of increased volume  Mood: "tired"  Affect: constricted  Thought Process: concrete but answers questions in a mostly linear fashion  Thought Content: denies auditory hallucinations, denies visual hallucinations, denies delusions  Risk Potential: denies suicidal ideation, plan, or intent. Denies homicidal ideation  Sensorium: Oriented to person, place, time, and situation  Cognition: cognitive ability mildly impaired but was not quantitatively tested  Consciousness: alert and awake  Attention: currently intact  Insight: limited  Judgement: limited      Progress Toward Goals & Illness Status: Patient is not at goal. They are not yet ready for discharge.  The patient's condition currently requires active psychopharmacological medication management, interdisciplinary coordination with case management, and the utilization of adjunctive milieu and group therapy to augment psychopharmacological efficacy. The patient's risk of morbidity, and progression or decompensation of psychiatric disease, is higher without this current treatment. This note has been constructed using a voice recognition system. There may be translation, syntax, or grammatical errors. If you have any questions, please contact the dictating provider.

## 2023-10-25 NOTE — MALNUTRITION/BMI
This medical record reflects one or more clinical indicators suggestive of malnutrition and/or morbid obesity. BMI Findings:  Adult BMI Classifications: Morbid Obesity 40-44.9        Body mass index is 43.9 kg/m². Treat with diet. See Nutrition note dated 10/24/23 for additional details. Completed nutrition assessment is viewable in the nutrition documentation.

## 2023-10-25 NOTE — PLAN OF CARE
Problem: Risk for Violence/Aggression Toward Others  Goal: Treatment Goal: Refrain from acts of violence/aggression during length of stay, and demonstrate improved impulse control at the time of discharge  Outcome: Progressing  Goal: Verbalize thoughts and feelings  Description: Interventions:  - Assess and re-assess patient's level of risk, every waking shift  - Engage patient in 1:1 interactions, daily, for a minimum of 15 minutes   - Allow patient to express feelings and frustrations in a safe and non-threatening manner   - Establish rapport/trust with patient   Outcome: Progressing  Goal: Refrain from harming others  Outcome: Progressing  Goal: Control angry outbursts  Description: Interventions:  - Monitor patient closely, per order  - Ensure early verbal de-escalation  - Monitor prn medication needs  - Set reasonable/therapeutic limits, outline behavioral expectations, and consequences   - Provide a non-threatening milieu, utilizing the least restrictive interventions   Outcome: Progressing  Goal: Attend and participate in unit activities, including therapeutic, recreational, and educational groups  Description: Interventions:  - Provide therapeutic and educational activities daily, encourage attendance and participation, and document same in the medical record   Outcome: Progressing  Goal: Identify appropriate positive anger management techniques  Description: Interventions:  - Offer anger management and coping skills groups   - Staff will provide positive feedback for appropriate anger control  Outcome: Progressing

## 2023-10-25 NOTE — PLAN OF CARE
Problem: Ineffective Coping  Goal: Cooperates with admission process  Description: Interventions:   - Complete admission process  Outcome: Progressing     Problem: DISCHARGE PLANNING  Goal: Discharge to home or other facility with appropriate resources  Description: INTERVENTIONS:  - Identify barriers to discharge w/patient and caregiver  - Arrange for needed discharge resources and transportation as appropriate  - Identify discharge learning needs (meds, wound care, etc.)  - Arrange for interpretive services to assist at discharge as needed  - Refer to Case Management Department for coordinating discharge planning if the patient needs post-hospital services based on physician/advanced practitioner order or complex needs related to functional status, cognitive ability, or social support system  Outcome: Progressing

## 2023-10-25 NOTE — NURSING NOTE
Pt appeared to be sleeping throughout night. Continuous rounding maintained throughout shift for safety.

## 2023-10-25 NOTE — NURSING NOTE
Patient stated to [facility] that RN is dosing his medications with cocaine. "That's why I feel shaky". Pt reassured.

## 2023-10-25 NOTE — PLAN OF CARE
Problem: Ineffective Coping  Goal: Identifies healthy coping skills  Outcome: Progressing  Goal: Demonstrates healthy coping skills  Outcome: Progressing     Problem: Risk for Violence/Aggression Toward Others  Goal: Attend and participate in unit activities, including therapeutic, recreational, and educational groups  Description: Interventions:  - Provide therapeutic and educational activities daily, encourage attendance and participation, and document same in the medical record   Outcome: Progressing  Note: Patient currently attending therapeutic groups and unit activities

## 2023-10-25 NOTE — PROGRESS NOTES
10/25/23 3074   Team Meeting   Meeting Type Daily Rounds   Team Members Present   Team Members Present Physician;Nurse;; Other (Discipline and Name)   Physician Team Member Dr. Jamaal Kaufman / Dr. Jorge Sebastian / Rosie Marie / Young Kim Team Member Roldan Salinas / [facility] Management Team Member Garry Fernández / Rick Barahona / Vik Argueta   Other (Discipline and Name) Sravan (Group Facilitator)   Patient/Family Present   Patient Present No   Patient's Family Present No       Status: Pt is reported to be calm, socializing with peers, and walking the halls. Pt is reported attending groups. Pt is reported to be denying SI, HI, and AVH. Pt is reported to have slept through the night. Medication: No medication changes and no PRNs given. D/C: No discharge date set at this time.

## 2023-10-25 NOTE — NURSING NOTE
Pt pleasant, calm and cooperative. Actively attending groups and socializing with peers. Reports he only has an increase in aggression when triggered by staff and peers at group home. No voiced complaints or concerns. Denies SI/HI/AVH.

## 2023-10-25 NOTE — NURSING NOTE
Pleasant and cooperative denies any issues at time in TV room with peers good interaction with staff and peers

## 2023-10-26 PROCEDURE — 99232 SBSQ HOSP IP/OBS MODERATE 35: CPT | Performed by: PSYCHIATRY & NEUROLOGY

## 2023-10-26 RX ADMIN — METHOCARBAMOL 500 MG: 500 TABLET ORAL at 09:48

## 2023-10-26 RX ADMIN — BUSPIRONE HYDROCHLORIDE 15 MG: 15 TABLET ORAL at 09:48

## 2023-10-26 RX ADMIN — RISPERIDONE 3 MG: 2 TABLET ORAL at 21:34

## 2023-10-26 RX ADMIN — LITHIUM CARBONATE 900 MG: 450 TABLET, EXTENDED RELEASE ORAL at 17:09

## 2023-10-26 RX ADMIN — BUSPIRONE HYDROCHLORIDE 15 MG: 15 TABLET ORAL at 17:09

## 2023-10-26 RX ADMIN — METHOCARBAMOL 500 MG: 500 TABLET ORAL at 21:34

## 2023-10-26 NOTE — NURSING NOTE
Bam Navarro is calm upon approach, observed walking in the halls and socializing with select peers. Patient attended evening group with appropriate participation. Bam Navarro denies current SI/HI/AH/VH, remains compliant with prescribed medications and was encouraged to seek staff with any issues and/or concerns.

## 2023-10-26 NOTE — NURSING NOTE
Pt calm and cooperative. Actively attending groups and socializing with peers. No unusual behaviors noted. No voiced concerns or complaints. Denies SI/HI/AVH. Will continue to monitor.

## 2023-10-26 NOTE — NURSING NOTE
Patient is visible in the day room playing board games with peers. He reports improvement in mood. Appears calm. He remains compliant with medications. Denies side effects. Denies SI, HI, AVH. Pt is hopeful for discharge soon. Pt encouraged to shower. Does not report unmet needs.

## 2023-10-26 NOTE — PROGRESS NOTES
Progress Note - Behavioral Health     Trena Fulton 19 y.o. male MRN: [id number]   Unit/Bed#: [facility] 202-02 Encounter: [id number]    Behavior over the last 24 hours: unchanged. Amandeep Zamudio is a 78-year-old male with a history of mood disorder and intellectual disabilities who presents for psychiatric follow-up. Staff reports no behavioral issues overnight. Upon approach, he is disheveled appearing and malodorous, but resting in bed in no acute distress. He is pleasant, calm and cooperative. He continues to minimize the events leading to hospitalization and continues to blame others stating, "they lied to me, it is all their fault. I never threatened anybody."  Patient does admit to saying inappropriate statements in the context of uncontrolled anger, but continues to deny any suicidal or homicidal ideations. Denies any manic or psychotic symptoms. He is hopeful for discharge to an independent living facility. He has been attending to ADLs somewhat regularly and was encouraged to shower given his malodorous appearance today. Sleep: normal  Appetite: normal  Medication side effects: No   ROS: all other systems are negative    Mental Status Evaluation:    Appearance: Hospital attire, not wearing a shirt, disheveled appearing and malodorous, appears consistent with stated age, bearded  Motor: no psychomotor disturbances, no gait abnormalities, in bed  Behavior: Pleasant, calm, cooperative, interacts with this writer appropriately  Speech: Loud and slow  Mood: "I feel good"  Affect: Bright, cheery, mood-congruent  Thought Process: organized, linear, and goal-oriented; intact associations  Thought Content: denies any delusional material, minimizing, perseverative  Perception: denies any auditory or visual hallucinations, denies other perceptual disturbances  Risk Potential: denies suicidal ideation, plan, or intent.  Denies homicidal ideation  Sensorium: Oriented to person, place, time, and situation  Cognition: cognitive ability appears somewhat diminished but was not quantitatively tested  Consciousness: alert and awake  Attention/Concentration: [place] than expected for age  Insight: Limited  Judgement: Limited    Vital signs in last 24 hours:    Temp:  [97.3 °F (36.3 °C)-99 °F (37.2 °C)] 97.3 °F (36.3 °C)  HR:  [76-91] 76  Resp:  [17-18] 17  BP: (106-129)/(57-89) 106/57    Laboratory results: I have personally reviewed all pertinent laboratory/tests results    Results from the past 24 hours: No results found for this or any previous visit (from the past 24 hour(s)). Most Recent Labs:   Lab Results   Component Value Date    WBC 7.41 10/24/2023    RBC 5.39 10/24/2023    HGB 16.3 10/24/2023    HCT 47.7 10/24/2023     10/24/2023    RDW 12.4 10/24/2023    NEUTROABS 3.76 10/24/2023    SODIUM 137 10/24/2023    K 4.0 10/24/2023     10/24/2023    CO2 26 10/24/2023    BUN 18 10/24/2023    CREATININE 0.68 10/24/2023    GLUC 99 10/24/2023    CALCIUM 9.6 10/24/2023    AST 32 10/24/2023    ALT 51 10/24/2023    ALKPHOS 41 10/24/2023    TP 7.6 10/24/2023    ALB 4.4 10/24/2023    TBILI 0.69 10/24/2023    CHOLESTEROL 202 (H) 10/24/2023    HDL 48 10/24/2023    TRIG 114 10/24/2023    LDLCALC 131 (H) 10/24/2023    NONHDLC 154 10/24/2023    LITHIUM 0.5 (L) 10/24/2023    [id number] 2.800 10/24/2023    SYPHILISAB Non-reactive 10/24/2023    HGBA1C 5.0 10/24/2023    EAG 97 10/24/2023       Progress Toward Goals: progressing    Assessment/Plan   Principal Problem:    Mood disorder ([address])  Active Problems:    ADHD (attention deficit hyperactivity disorder)    Intermittent explosive disorder in adult    Autism spectrum disorder    Medical clearance for psychiatric admission    Medical marijuana use      Recommended Treatment:     Planned medication and treatment changes:     All current active medications have been reviewed  Encourage group therapy, milieu therapy and occupational therapy  Behavioral Health checks every 7 minutes    Continue current medications. Lithium level was 0.5 upon admission and appears clinically therapeutic at this time. There have been no behavioral outbursts while on the unit and he continues to deny all psychiatric symptoms aside from feeling angry prior to admission. I feel that his behaviors are at least partly a result of his intellectual disabilities.     Current Facility-Administered Medications   Medication Dose Route Frequency Provider Last Rate    acetaminophen  650 mg Oral Q6H PRN Annelle Louder, PA-C      acetaminophen  650 mg Oral Q4H PRN Annelle Louder, PA-C      acetaminophen  975 mg Oral Q6H PRN Annelle Louder, PA-C      aluminum-magnesium hydroxide-simethicone  30 mL Oral Q4H PRN Annelle Louder, PA-C      artificial tear   Both Eyes 4x Daily PRN Annelle Louder, PA-C      haloperidol lactate  2.5 mg Intramuscular Q6H PRN Max 4/day Annelle Louder, PA-C      And    LORazepam  1 mg Intramuscular Q6H PRN Max 4/day Annelle Louder, PA-C      And    benztropine  0.5 mg Intramuscular Q6H PRN Max 4/day Annelle Louder, PA-C      benztropine  1 mg Intramuscular BID PRN Annelle Louder, PA-C      haloperidol lactate  5 mg Intramuscular Q4H PRN Max 4/day Annelle Louder, PA-C      And    LORazepam  2 mg Intramuscular Q4H PRN Max 4/day Annelle Louder, PA-C      And    benztropine  1 mg Intramuscular Q4H PRN Max 4/day Annelle Louder, PA-C      benztropine  1 mg Oral BID PRN Annelle Louder, PA-C      bisacodyl  10 mg Rectal Daily PRN Annelle Louder, PA-C      busPIRone  15 mg Oral BID Annelle Louder, PA-C      haloperidol  2 mg Oral Q4H PRN Max 6/day Annelle Louder, PA-C      haloperidol  5 mg Oral Q6H PRN Max 4/day Annelle Louder, PA-C      haloperidol  5 mg Oral Q4H PRN Max 4/day Annelle Louder, PA-C      hydrOXYzine HCL  25 mg Oral Q6H PRN Max 4/day Annelle Louder, PA-C      hydrOXYzine HCL  50 mg Oral Q4H PRN Max 4/day Annelle Louder, PA-C      Or    LORazepam  1 mg Intramuscular Q4H PRN Kristie Morgan PA-C      lithium carbonate  900 mg Oral QPM Kristie Morgan PA-C      LORazepam  1 mg Oral Q4H PRN Max 6/day Kristie Morgan PA-C      Or    LORazepam  2 mg Intramuscular Q6H PRN Max 3/day Kristie Morgan PA-C      magnesium hydroxide  30 mL Oral Daily PRN Kristie Morgan PA-C      methocarbamol  500 mg Oral BID Kristie Morgan PA-C      risperiDONE  3 mg Oral HS Kristie Morgan PA-C      senna-docusate sodium  1 tablet Oral Daily PRN Kristie Morgan PA-C      traZODone  50 mg Oral HS PRN Kristie Morgan PA-C       Risks / Benefits of Treatment:    Risks, benefits, and possible side effects of medications explained to patient and patient verbalizes understanding and agreement for treatment. Counseling / Coordination of Care:    Patient's progress discussed with staff in treatment team meeting. Medications, treatment progress and treatment plan reviewed with patient.     Kristie Morgan PA-C 10/26/23

## 2023-10-26 NOTE — PROGRESS NOTES
10/26/23 0750   Team Meeting   Meeting Type Daily Rounds   Team Members Present   Team Members Present Physician;Nurse;; Other (Discipline and Name)   Physician Team Member Dr. Tung Centeno / Dr. Mikki Howell / Krupa Lewis / Erum Saavedra Team Member Lolis Becker / Nursing Students   Care Management Team Member Mireya Vickres / Dora Haskins / Leandro Cobos   Other (Discipline and Name) Sigmund (Group Facilitator)   Patient/Family Present   Patient Present No   Patient's Family Present No       Status: Pt is reported to be calm and cooperative. Pt is reported to be socializing with peers and attending groups. Pt is reported to be denying SI, HI, and AVH. Pt is reported to have slept through the night. Medication: No medication changes and no PRNs given. D/C: Discharge date discussed for 10/31/2023.

## 2023-10-26 NOTE — PLAN OF CARE
Problem: Risk for Violence/Aggression Toward Others  Goal: Treatment Goal: Refrain from acts of violence/aggression during length of stay, and demonstrate improved impulse control at the time of discharge  Outcome: Progressing  Goal: Verbalize thoughts and feelings  Description: Interventions:  - Assess and re-assess patient's level of risk, every waking shift  - Engage patient in 1:1 interactions, daily, for a minimum of 15 minutes   - Allow patient to express feelings and frustrations in a safe and non-threatening manner   - Establish rapport/trust with patient   Outcome: Progressing  Goal: Refrain from harming others  Outcome: Progressing  Goal: Control angry outbursts  Description: Interventions:  - Monitor patient closely, per order  - Ensure early verbal de-escalation  - Monitor prn medication needs  - Set reasonable/therapeutic limits, outline behavioral expectations, and consequences   - Provide a non-threatening milieu, utilizing the least restrictive interventions   Outcome: Progressing  Goal: Identify appropriate positive anger management techniques  Description: Interventions:  - Offer anger management and coping skills groups   - Staff will provide positive feedback for appropriate anger control  Outcome: Progressing

## 2023-10-27 PROCEDURE — 99232 SBSQ HOSP IP/OBS MODERATE 35: CPT | Performed by: PSYCHIATRY & NEUROLOGY

## 2023-10-27 RX ADMIN — BUSPIRONE HYDROCHLORIDE 15 MG: 15 TABLET ORAL at 08:19

## 2023-10-27 RX ADMIN — RISPERIDONE 3 MG: 2 TABLET ORAL at 21:28

## 2023-10-27 RX ADMIN — LITHIUM CARBONATE 900 MG: 450 TABLET, EXTENDED RELEASE ORAL at 17:43

## 2023-10-27 RX ADMIN — METHOCARBAMOL 500 MG: 500 TABLET ORAL at 21:28

## 2023-10-27 RX ADMIN — BUSPIRONE HYDROCHLORIDE 15 MG: 15 TABLET ORAL at 17:43

## 2023-10-27 RX ADMIN — METHOCARBAMOL 500 MG: 500 TABLET ORAL at 08:19

## 2023-10-27 NOTE — PROGRESS NOTES
10/27/23 0750   Team Meeting   Meeting Type Daily Rounds   Team Members Present   Team Members Present Physician;Nurse;; Other (Discipline and Name)   Physician Team Member Dr. Tiffany Hairston / Dr. Krystal Cash / Sanya Rizzo / Tasneem Mcghee Team Member Dillon Riojas / [facility] Team Member Rambo Robertson / Darron Fowler / Jack Tyler   Other (Discipline and Name) Sravan (Group Facilitator)   Patient/Family Present   Patient Present No   Patient's Family Present No       Status: Pt is reported to be visible and social with peers. Pt is reported to be endorsing improved mood. Pt is reported to be calm and cooperative. Pt is reported to be denying SI, HI, and AVH. Pt is reported to have poor hygiene and declined to shower. Medication: No medication changes and no PRNs given. D/C: Pt is scheduled to be discharged on 10/31/2023.

## 2023-10-27 NOTE — PROGRESS NOTES
Progress Note - Behavioral Health     Lincoln Ann 19 y.o. male MRN: [id number]   Unit/Bed#: [address] 202-02 Encounter: [id number]    Behavior over the last 24 hours: improved. Nargis Zhao is a 20 y/o M with PMH of mood disorder and intellectual disabilities who presents for psychiatric follow-up. No acute overnight events. Patient appears disheveled, malodorous, and laying in bed. He states "I feel good" and denies all psychiatric symptoms including depression, anxiety, anger, impulsivity, and irritability. Also denies ry or psychotic symptoms. He reports a higher energy level than yesterday and is participating in groups. He is eating 3 meals per day without difficulty. He is pleasant and cooperative during the interview. He denies SI/HI. Denies AH/VH. When discussing his hygiene, he states that he doesn't have clean clothes but we reiterated that we can provide him clean scrubs after his showers and that maintenance will address the "flooding" issue from the shower. Also discussed that nursing can help him shave his beard, which he appears interested in.      Sleep: normal, although patient reports difficulty sleeping, he says he slept about 8 hours   Appetite: normal  Medication side effects: No   ROS: all other systems are negative    Mental Status Evaluation:    Appearance:  age appropriate, dressed in hospital attire, disheveled, malodorous, not wearing a shirt, bearded   Behavior:  pleasant, cooperative, calm   Speech:  slow, loud, sometimes delayed    Mood:  "I feel good"   Affect:  mood-congruent, bright    Thought Process:  organized, linear   Associations: intact associations   Thought Content:  no overt delusions   Perceptual Disturbances: no auditory hallucinations, no visual hallucinations   Risk Potential: Suicidal ideation - None  Homicidal ideation - None  Potential for aggression - Not at present   Sensorium:  oriented to person, place, and time/date   Memory:  recent and remote memory grossly intact   Consciousness:  alert and awake   Attention/Concentration: attention span and concentration appear shorter than expected for age   Insight:  impaired due to intellectual disability   Judgment: impaired due to intellectual disability   Gait/Station: normal gait/station, in bed   Motor Activity: no abnormal movements     Vital signs in last 24 hours:    Temp:  [97.4 °F (36.3 °C)] 97.4 °F (36.3 °C)  HR:  [69] 69  Resp:  [16] 16  BP: (107)/(66) 107/66    Laboratory results: I have personally reviewed all pertinent laboratory/tests results    Results from the past 24 hours: No results found for this or any previous visit (from the past 24 hour(s)). Most Recent Labs:   Lab Results   Component Value Date    WBC 7.41 10/24/2023    RBC 5.39 10/24/2023    HGB 16.3 10/24/2023    HCT 47.7 10/24/2023     10/24/2023    RDW 12.4 10/24/2023    NEUTROABS 3.76 10/24/2023    SODIUM 137 10/24/2023    K 4.0 10/24/2023     10/24/2023    CO2 26 10/24/2023    BUN 18 10/24/2023    CREATININE 0.68 10/24/2023    GLUC 99 10/24/2023    CALCIUM 9.6 10/24/2023    AST 32 10/24/2023    ALT 51 10/24/2023    ALKPHOS 41 10/24/2023    TP 7.6 10/24/2023    ALB 4.4 10/24/2023    TBILI 0.69 10/24/2023    CHOLESTEROL 202 (H) 10/24/2023    HDL 48 10/24/2023    TRIG 114 10/24/2023    LDLCALC 131 (H) 10/24/2023    NONHDLC 154 10/24/2023    LITHIUM 0.5 (L) 10/24/2023    [id number] 2.800 10/24/2023    SYPHILISAB Non-reactive 10/24/2023    HGBA1C 5.0 10/24/2023    EAG 97 10/24/2023       Progress Toward Goals: progressing    Assessment/Plan   Principal Problem:    Mood disorder ([address])  Active Problems:    ADHD (attention deficit hyperactivity disorder)    Intermittent explosive disorder in adult    Autism spectrum disorder    Medical clearance for psychiatric admission    Medical marijuana use      Recommended Treatment:     Planned medication and treatment changes:     All current active medications have been reviewed  Encourage group therapy, milieu therapy and occupational therapy  Behavioral Health checks every 7 minutes    Continue current medications. Patient continues to behave well without any issues on the unit and continues to deny all psychiatric symptoms. Patient appears to be at baseline behavioral level with intellectual disability. Continue to monitor and plan for discharge on Tuesday. Case management working on placement.      Current Facility-Administered Medications   Medication Dose Route Frequency Provider Last Rate    acetaminophen  650 mg Oral Q6H PRN Henrietta Sicks, PA-C      acetaminophen  650 mg Oral Q4H PRN Henrietta Sicks, PA-C      acetaminophen  975 mg Oral Q6H PRN Henrietta Sicks, PA-C      aluminum-magnesium hydroxide-simethicone  30 mL Oral Q4H PRN Henrietta Sicks, PA-C      artificial tear   Both Eyes 4x Daily PRN Henrietta Sicks, PA-C      haloperidol lactate  2.5 mg Intramuscular Q6H PRN Max 4/day Henrietta Sicks, PA-C      And    LORazepam  1 mg Intramuscular Q6H PRN Max 4/day Henrietta Sicks, PA-C      And    benztropine  0.5 mg Intramuscular Q6H PRN Max 4/day Henrietta Sicks, PA-C      benztropine  1 mg Intramuscular BID PRN Henrietta Sicks, PA-C      haloperidol lactate  5 mg Intramuscular Q4H PRN Max 4/day Henrietta Sicks, PA-C      And    LORazepam  2 mg Intramuscular Q4H PRN Max 4/day Henrietta Sicks, PA-C      And    benztropine  1 mg Intramuscular Q4H PRN Max 4/day Henrietta Sicks, PA-C      benztropine  1 mg Oral BID PRN Henrietta Sicks, PA-C      bisacodyl  10 mg Rectal Daily PRN Henrietta Sicks, PA-C      busPIRone  15 mg Oral BID Henrietta Sicks, PA-C      haloperidol  2 mg Oral Q4H PRN Max 6/day Henrietta Sicks, PA-C      haloperidol  5 mg Oral Q6H PRN Max 4/day Henrietta Sicks, PA-C      haloperidol  5 mg Oral Q4H PRN Max 4/day Henrietta Sicks, PA-C      hydrOXYzine HCL  25 mg Oral Q6H PRN Max 4/day Henrietta Sicks, PA-C      hydrOXYzine HCL  50 mg Oral Q4H PRN Max 4/day Henrietta Sicks, PA-C Or    LORazepam  1 mg Intramuscular Q4H PRN Jessika Dee PA-C      lithium carbonate  900 mg Oral QPM Jessika Dee PA-C      LORazepam  1 mg Oral Q4H PRN Max 6/day Jessika Dee PA-C      Or    LORazepam  2 mg Intramuscular Q6H PRN Max 3/day Jessika Dee PA-C      magnesium hydroxide  30 mL Oral Daily PRN Jessika Dee PA-C      methocarbamol  500 mg Oral BID Jessika Dee PA-C      risperiDONE  3 mg Oral HS Jessika Dee PA-C      senna-docusate sodium  1 tablet Oral Daily PRN Jessika Dee PA-C      traZODone  50 mg Oral HS PRN Jessika Dee PA-C       Risks / Benefits of Treatment:    Risks, benefits, and possible side effects of medications explained to patient and patient verbalizes understanding and agreement for treatment. Counseling / Coordination of Care:    Patient's progress discussed with staff in treatment team meeting. Medications, treatment progress and treatment plan reviewed with patient.     Jessika Dee PA-C 10/27/23

## 2023-10-27 NOTE — CASE MANAGEMENT
CM called Marilee to inform her of the Pt's discharge date for Tuesday 10/31/2023. CM stated that they recall her saying that the may need about two weeks until the Pt can go into the apartment. Bob Molina stated that they got the apartment and they are just waiting on [facility] to approve the licensing for it. Bob Molina stated that she hasn't heard anything yet today but hopes to hear form them by Monday. Bob Molina stated that she will inform her team of the discharge date and if they don't have the licensing they will figure something out for the Pt. CM stated that they have heard that they can be put in a hotel sometimes until the licensing is approved Bob Molina stated that they have done that before so they will see if they need to do it for him. CM met with Pt to discuss discharge planning. CM stated that he is set to discharge on Tuesday 10/31/2023. CM stated that he will be returning to his [facility]. CM stated that they have been working on getting him his own apartment and now they just need the license to approve it through the [facility]. CM stated that they had confirmed that they are working on his SSI but it takes sometime since his mom was his rep-payee before. Pt stated that he isn't sure why they couldn't have worked on this while he was in intermediate. CM attempted to explain the process but Pt was unable to understand. CM suggested the Pt get what he needs to get and then discuss with the [facility] transitioning into his own independent living. Pt verbalized understanding. Pt discussed his frustration with the [facility] for threatening to 302 him if he doesn't sign himself in to the hospital. CM stated that Pt will be getting new [facility] staff so hopefully they will treat him better. CM stated that [facility] where he gets his MHOP has a day program he can go to. Pt stated he is not interested in going to the [facility] one. CM stated that they will contact [facility] on Monday to see if he can begin going there.  Pt confirmed he would like to go there.

## 2023-10-28 PROCEDURE — 99232 SBSQ HOSP IP/OBS MODERATE 35: CPT | Performed by: PSYCHIATRY & NEUROLOGY

## 2023-10-28 RX ADMIN — LITHIUM CARBONATE 900 MG: 450 TABLET, EXTENDED RELEASE ORAL at 17:24

## 2023-10-28 RX ADMIN — METHOCARBAMOL 500 MG: 500 TABLET ORAL at 08:54

## 2023-10-28 RX ADMIN — RISPERIDONE 3 MG: 2 TABLET ORAL at 21:55

## 2023-10-28 RX ADMIN — BUSPIRONE HYDROCHLORIDE 15 MG: 15 TABLET ORAL at 17:24

## 2023-10-28 RX ADMIN — METHOCARBAMOL 500 MG: 500 TABLET ORAL at 21:56

## 2023-10-28 RX ADMIN — BUSPIRONE HYDROCHLORIDE 15 MG: 15 TABLET ORAL at 08:54

## 2023-10-28 NOTE — NURSING NOTE
Patient appeared to sleep well. Observed resting in bed with eyes closed, no signs of distress, regular respirations. Continuous rounding maintained throughout shift for patient safety.

## 2023-10-28 NOTE — PLAN OF CARE
Problem: DISCHARGE PLANNING  Goal: Discharge to home or other facility with appropriate resources  Description: INTERVENTIONS:  - Identify barriers to discharge w/patient and caregiver  - Arrange for needed discharge resources and transportation as appropriate  - Identify discharge learning needs (meds, wound care, etc.)  - Arrange for interpretive services to assist at discharge as needed  - Refer to Case Management Department for coordinating discharge planning if the patient needs post-hospital services based on physician/advanced practitioner order or complex needs related to functional status, cognitive ability, or social support system  Outcome: Progressing     Problem: Ineffective Coping  Goal: Cooperates with admission process  Description: Interventions:   - Complete admission process  Outcome: Progressing  Goal: Identifies healthy coping skills  Outcome: Progressing  Goal: Demonstrates healthy coping skills  Outcome: Progressing     Problem: Risk for Violence/Aggression Toward Others  Goal: Treatment Goal: Refrain from acts of violence/aggression during length of stay, and demonstrate improved impulse control at the time of discharge  Outcome: Progressing  Goal: Verbalize thoughts and feelings  Description: Interventions:  - Assess and re-assess patient's level of risk, every waking shift  - Engage patient in 1:1 interactions, daily, for a minimum of 15 minutes   - Allow patient to express feelings and frustrations in a safe and non-threatening manner   - Establish rapport/trust with patient   Outcome: Progressing  Goal: Refrain from harming others  Outcome: Progressing  Goal: Control angry outbursts  Description: Interventions:  - Monitor patient closely, per order  - Ensure early verbal de-escalation  - Monitor prn medication needs  - Set reasonable/therapeutic limits, outline behavioral expectations, and consequences   - Provide a non-threatening milieu, utilizing the least restrictive interventions Outcome: Progressing  Goal: Attend and participate in unit activities, including therapeutic, recreational, and educational groups  Description: Interventions:  - Provide therapeutic and educational activities daily, encourage attendance and participation, and document same in the medical record   Outcome: Progressing  Goal: Identify appropriate positive anger management techniques  Description: Interventions:  - Offer anger management and coping skills groups   - Staff will provide positive feedback for appropriate anger control  Outcome: Progressing

## 2023-10-28 NOTE — NURSING NOTE
Pt reports no changes since earlier assessment. Continues to deny SI/HI/AVH. Pleasant and cooperative.

## 2023-10-28 NOTE — NURSING NOTE
Patient is visible at times, napping on and off throughout the shift. Pleasant on approach. Denies SI HI and hallucinations. Encouraged out of room to unit activities and did verbalize intent to attend evening wrap up group. Denies questions or concerns at this time. Staff availability reinforced.  Education provided and reviewed on metabolic syndrome and Risperdal.

## 2023-10-28 NOTE — PROGRESS NOTES
Progress Note - Behavioral Health   Jennifer Laurent 19 y.o. male MRN: [id number]  Unit/Bed#: [facility] 202-02 Encounter: [id number]    Assessment:  Principal Problem:    Mood disorder ([address])  Active Problems:    ADHD (attention deficit hyperactivity disorder)    Intermittent explosive disorder in adult    Autism spectrum disorder    Medical clearance for psychiatric admission    Medical marijuana use      Plan:  --Continue with psychiatric hospitalization  --Continue with individual, group, and milieu therapy  --Continue the following medications:  Current Facility-Administered Medications   Medication Dose Route Frequency    acetaminophen (TYLENOL) tablet 650 mg  650 mg Oral Q6H PRN    acetaminophen (TYLENOL) tablet 650 mg  650 mg Oral Q4H PRN    acetaminophen (TYLENOL) tablet 975 mg  975 mg Oral Q6H PRN    aluminum-magnesium hydroxide-simethicone (MAALOX) oral suspension 30 mL  30 mL Oral Q4H PRN    artificial tear (LUBRIFRESH P.M.) ophthalmic ointment   Both Eyes 4x Daily PRN    haloperidol lactate (HALDOL) injection 2.5 mg  2.5 mg Intramuscular Q6H PRN Max 4/day    And    LORazepam (ATIVAN) injection 1 mg  1 mg Intramuscular Q6H PRN Max 4/day    And    benztropine (COGENTIN) injection 0.5 mg  0.5 mg Intramuscular Q6H PRN Max 4/day    benztropine (COGENTIN) injection 1 mg  1 mg Intramuscular BID PRN    haloperidol lactate (HALDOL) injection 5 mg  5 mg Intramuscular Q4H PRN Max 4/day    And    LORazepam (ATIVAN) injection 2 mg  2 mg Intramuscular Q4H PRN Max 4/day    And    benztropine (COGENTIN) injection 1 mg  1 mg Intramuscular Q4H PRN Max 4/day    benztropine (COGENTIN) tablet 1 mg  1 mg Oral BID PRN    bisacodyl (DULCOLAX) rectal suppository 10 mg  10 mg Rectal Daily PRN    busPIRone (BUSPAR) tablet 15 mg  15 mg Oral BID    haloperidol (HALDOL) tablet 2 mg  2 mg Oral Q4H PRN Max 6/day    haloperidol (HALDOL) tablet 5 mg  5 mg Oral Q6H PRN Max 4/day    haloperidol (HALDOL) tablet 5 mg  5 mg Oral Q4H PRN Max 4/day hydrOXYzine HCL (ATARAX) tablet 25 mg  25 mg Oral Q6H PRN Max 4/day    hydrOXYzine HCL (ATARAX) tablet 50 mg  50 mg Oral Q4H PRN Max 4/day    Or    LORazepam (ATIVAN) injection 1 mg  1 mg Intramuscular Q4H PRN    lithium carbonate (LITHOBID) CR tablet 900 mg  900 mg Oral QPM    LORazepam (ATIVAN) tablet 1 mg  1 mg Oral Q4H PRN Max 6/day    Or    LORazepam (ATIVAN) injection 2 mg  2 mg Intramuscular Q6H PRN Max 3/day    magnesium hydroxide (MILK OF MAGNESIA) oral suspension 30 mL  30 mL Oral Daily PRN    methocarbamol (ROBAXIN) tablet 500 mg  500 mg Oral BID    risperiDONE (RisperDAL) tablet 3 mg  3 mg Oral HS    senna-docusate sodium (SENOKOT S) 8.6-50 mg per tablet 1 tablet  1 tablet Oral Daily PRN    traZODone (DESYREL) tablet 50 mg  50 mg Oral HS PRN       Subjective: Patient was seen for continuation of care. Chart was reviewed and discussed with treatment team.     No acute behavioral events over the past 24 hours. Today, patient was seen and examined at bedside for continuation of care. Patient is w/o complaint. He is malodorous but pleasantly refuses to shower stating "no thank you."     Patient denied adverse effects to their psychiatric medication regimen. Patient denied other new or worsening psychiatric symptoms/complaints at this time. Discussed the importance of continuing to take medications as prescribed, as well as the importance of continuing to attend groups on the unit. Psychiatric Review of Systems:  Medication adverse effects: none  Sleep: unchanged  Appetite: unchanged  Behavior over the past 24 hours: as per above    Vitals:  Vitals:    10/28/23 0700   BP: 90/61   Pulse: 71   Resp: 18   Temp: 98.2 °F (36.8 °C)   SpO2: 97%       Laboratory results:    I have personally reviewed all pertinent laboratory/tests results  No results found for this or any previous visit (from the past 48 hour(s)). Current Medications:  Current medications as per above. All medications have been reviewed. Risks, benefits, alternatives, and possible side effects of patient's psychiatric medications were discussed with patient. Mental Status Evaluation:  Appearance: unkempt, malodorous  Motor: no psychomotor disturbances  Behavior: superficially cooperative  Speech: mostly normal with brief periods of increased volume  Mood: "good"  Affect: constricted  Thought Process: concrete but answers questions in a mostly linear fashion  Thought Content: denies auditory hallucinations, denies visual hallucinations, denies delusions  Risk Potential: denies suicidal ideation, plan, or intent. Denies homicidal ideation  Sensorium: Oriented to person, place, time, and situation  Cognition: cognitive ability mildly impaired but was not quantitatively tested  Consciousness: alert and awake  Attention: currently intact  Insight: limited  Judgement: limited      Progress Toward Goals & Illness Status: Patient is not at goal. They are not yet ready for discharge. The patient's condition currently requires active psychopharmacological medication management, interdisciplinary coordination with case management, and the utilization of adjunctive milieu and group therapy to augment psychopharmacological efficacy. The patient's risk of morbidity, and progression or decompensation of psychiatric disease, is higher without this current treatment. This note has been constructed using a voice recognition system. There may be translation, syntax, or grammatical errors. If you have any questions, please contact the dictating provider.

## 2023-10-29 PROCEDURE — 99232 SBSQ HOSP IP/OBS MODERATE 35: CPT | Performed by: PSYCHIATRY & NEUROLOGY

## 2023-10-29 RX ADMIN — BUSPIRONE HYDROCHLORIDE 15 MG: 15 TABLET ORAL at 09:18

## 2023-10-29 RX ADMIN — RISPERIDONE 3 MG: 2 TABLET ORAL at 21:07

## 2023-10-29 RX ADMIN — METHOCARBAMOL 500 MG: 500 TABLET ORAL at 21:07

## 2023-10-29 RX ADMIN — METHOCARBAMOL 500 MG: 500 TABLET ORAL at 09:18

## 2023-10-29 RX ADMIN — BUSPIRONE HYDROCHLORIDE 15 MG: 15 TABLET ORAL at 17:03

## 2023-10-29 RX ADMIN — LITHIUM CARBONATE 900 MG: 450 TABLET, EXTENDED RELEASE ORAL at 17:03

## 2023-10-29 NOTE — PROGRESS NOTES
Progress Note - Behavioral Health   Wesly Johnson 19 y.o. male MRN: [id number]  Unit/Bed#: [facility] 202-02 Encounter: [id number]    Assessment:  Principal Problem:    Mood disorder ([address])  Active Problems:    ADHD (attention deficit hyperactivity disorder)    Intermittent explosive disorder in adult    Autism spectrum disorder    Medical clearance for psychiatric admission    Medical marijuana use      Plan:  --Continue with psychiatric hospitalization  --Continue with individual, group, and milieu therapy  --Continue the following medications:  Current Facility-Administered Medications   Medication Dose Route Frequency    acetaminophen (TYLENOL) tablet 650 mg  650 mg Oral Q6H PRN    acetaminophen (TYLENOL) tablet 650 mg  650 mg Oral Q4H PRN    acetaminophen (TYLENOL) tablet 975 mg  975 mg Oral Q6H PRN    aluminum-magnesium hydroxide-simethicone (MAALOX) oral suspension 30 mL  30 mL Oral Q4H PRN    artificial tear (LUBRIFRESH P.M.) ophthalmic ointment   Both Eyes 4x Daily PRN    haloperidol lactate (HALDOL) injection 2.5 mg  2.5 mg Intramuscular Q6H PRN Max 4/day    And    LORazepam (ATIVAN) injection 1 mg  1 mg Intramuscular Q6H PRN Max 4/day    And    benztropine (COGENTIN) injection 0.5 mg  0.5 mg Intramuscular Q6H PRN Max 4/day    benztropine (COGENTIN) injection 1 mg  1 mg Intramuscular BID PRN    haloperidol lactate (HALDOL) injection 5 mg  5 mg Intramuscular Q4H PRN Max 4/day    And    LORazepam (ATIVAN) injection 2 mg  2 mg Intramuscular Q4H PRN Max 4/day    And    benztropine (COGENTIN) injection 1 mg  1 mg Intramuscular Q4H PRN Max 4/day    benztropine (COGENTIN) tablet 1 mg  1 mg Oral BID PRN    bisacodyl (DULCOLAX) rectal suppository 10 mg  10 mg Rectal Daily PRN    busPIRone (BUSPAR) tablet 15 mg  15 mg Oral BID    haloperidol (HALDOL) tablet 2 mg  2 mg Oral Q4H PRN Max 6/day    haloperidol (HALDOL) tablet 5 mg  5 mg Oral Q6H PRN Max 4/day    haloperidol (HALDOL) tablet 5 mg  5 mg Oral Q4H PRN Max 4/day hydrOXYzine HCL (ATARAX) tablet 25 mg  25 mg Oral Q6H PRN Max 4/day    hydrOXYzine HCL (ATARAX) tablet 50 mg  50 mg Oral Q4H PRN Max 4/day    Or    LORazepam (ATIVAN) injection 1 mg  1 mg Intramuscular Q4H PRN    lithium carbonate (LITHOBID) CR tablet 900 mg  900 mg Oral QPM    LORazepam (ATIVAN) tablet 1 mg  1 mg Oral Q4H PRN Max 6/day    Or    LORazepam (ATIVAN) injection 2 mg  2 mg Intramuscular Q6H PRN Max 3/day    magnesium hydroxide (MILK OF MAGNESIA) oral suspension 30 mL  30 mL Oral Daily PRN    methocarbamol (ROBAXIN) tablet 500 mg  500 mg Oral BID    risperiDONE (RisperDAL) tablet 3 mg  3 mg Oral HS    senna-docusate sodium (SENOKOT S) 8.6-50 mg per tablet 1 tablet  1 tablet Oral Daily PRN    traZODone (DESYREL) tablet 50 mg  50 mg Oral HS PRN       Subjective: Patient was seen for continuation of care. Chart was reviewed and discussed with treatment team.     No acute behavioral events over the past 24 hours. Today, patient was seen and examined at bedside for continuation of care. Patient is doing well and without complaints. He is visible and social in the milieu. He is cooperative with staff and selectively social with peers. Patient denied adverse effects to their psychiatric medication regimen. Patient denied other new or worsening psychiatric symptoms/complaints at this time. Discussed the importance of continuing to take medications as prescribed, as well as the importance of continuing to attend groups on the unit. Psychiatric Review of Systems:  Medication adverse effects: none  Sleep: unchanged  Appetite: unchanged  Behavior over the past 24 hours: as per above    Vitals:  Vitals:    10/29/23 0740   BP: 106/50   Pulse: 79   Resp: 16   Temp: 98.8 °F (37.1 °C)   SpO2:        Laboratory results:    I have personally reviewed all pertinent laboratory/tests results  No results found for this or any previous visit (from the past 48 hour(s)).      Current Medications:  Current medications as per above. All medications have been reviewed. Risks, benefits, alternatives, and possible side effects of patient's psychiatric medications were discussed with patient. Mental Status Evaluation:  Appearance: moderately kempt  Motor: no psychomotor disturbances  Behavior: superficially cooperative  Speech: mostly normal with brief periods of increased volume  Mood: "good"  Affect: constricted  Thought Process: concrete but answers questions in a mostly linear fashion  Thought Content: denies auditory hallucinations, denies visual hallucinations, denies delusions  Risk Potential: denies suicidal ideation, plan, or intent. Denies homicidal ideation  Sensorium: Oriented to person, place, time, and situation  Cognition: cognitive ability mildly impaired but was not quantitatively tested  Consciousness: alert and awake  Attention: currently intact  Insight: limited  Judgement: limited      Progress Toward Goals & Illness Status: Patient is not at goal. They are not yet ready for discharge. The patient's condition currently requires active psychopharmacological medication management, interdisciplinary coordination with case management, and the utilization of adjunctive milieu and group therapy to augment psychopharmacological efficacy. The patient's risk of morbidity, and progression or decompensation of psychiatric disease, is higher without this current treatment. This note has been constructed using a voice recognition system. There may be translation, syntax, or grammatical errors. If you have any questions, please contact the dictating provider.

## 2023-10-29 NOTE — PLAN OF CARE
Problem: Ineffective Coping  Goal: Identifies healthy coping skills  Outcome: Progressing     Problem: Risk for Violence/Aggression Toward Others  Goal: Treatment Goal: Refrain from acts of violence/aggression during length of stay, and demonstrate improved impulse control at the time of discharge  Outcome: Progressing  Goal: Verbalize thoughts and feelings  Description: Interventions:  - Assess and re-assess patient's level of risk, every waking shift  - Engage patient in 1:1 interactions, daily, for a minimum of 15 minutes   - Allow patient to express feelings and frustrations in a safe and non-threatening manner   - Establish rapport/trust with patient   Outcome: Progressing  Goal: Control angry outbursts  Description: Interventions:  - Monitor patient closely, per order  - Ensure early verbal de-escalation  - Monitor prn medication needs  - Set reasonable/therapeutic limits, outline behavioral expectations, and consequences   - Provide a non-threatening milieu, utilizing the least restrictive interventions   Outcome: Progressing

## 2023-10-29 NOTE — TREATMENT TEAM
10/28/23 0951   Team Meeting   Meeting Type Daily Rounds   Team Members Present   Team Members Present Physician;Nurse   Physician Team Member Rachel/uKn   Nursing Team Member Faby   Patient/Family Present   Patient Present No   Patient's Family Present No       AM Rounds:  visible, social, no aggression while IP. Playing games with peers.

## 2023-10-29 NOTE — NURSING NOTE
Gerda Harvey is pleasant upon approach, observed playing cards and socializing with peers in the day room. Patient denies current SI/HI/AH/VH, endorses positive mood this evening. Gerda Harvey is compliant with prescribed medications and attends scheduled groups on the unit. Patient endorses sleeping well overnight and was encouraged to seek staff with any questions and/or concerns, verbalized understanding.

## 2023-10-29 NOTE — TREATMENT TEAM
10/29/23 0900   Team Meeting   Meeting Type Daily Rounds   Team Members Present   Team Members Present Physician;Nurse   Physician Team Member Rachel/Kun   Nursing Team Member Jeff   Patient/Family Present   Patient Present No   Patient's Family Present No       AM Rounds:  ASD, visible, social, playing board games with peers. Showered, less malodorous. Pleasant, cooperative. No aggressive behaviors. normal

## 2023-10-30 PROBLEM — Z00.8 MEDICAL CLEARANCE FOR PSYCHIATRIC ADMISSION: Status: RESOLVED | Noted: 2023-09-22 | Resolved: 2023-10-30

## 2023-10-30 PROCEDURE — 99232 SBSQ HOSP IP/OBS MODERATE 35: CPT | Performed by: PSYCHIATRY & NEUROLOGY

## 2023-10-30 RX ADMIN — LITHIUM CARBONATE 900 MG: 450 TABLET, EXTENDED RELEASE ORAL at 17:01

## 2023-10-30 RX ADMIN — METHOCARBAMOL 500 MG: 500 TABLET ORAL at 21:42

## 2023-10-30 RX ADMIN — BUSPIRONE HYDROCHLORIDE 15 MG: 15 TABLET ORAL at 17:00

## 2023-10-30 RX ADMIN — RISPERIDONE 3 MG: 2 TABLET ORAL at 21:42

## 2023-10-30 RX ADMIN — BUSPIRONE HYDROCHLORIDE 15 MG: 15 TABLET ORAL at 09:09

## 2023-10-30 RX ADMIN — METHOCARBAMOL 500 MG: 500 TABLET ORAL at 09:09

## 2023-10-30 NOTE — PLAN OF CARE
Problem: Ineffective Coping  Goal: Identifies healthy coping skills  Outcome: Progressing  Goal: Demonstrates healthy coping skills  Outcome: Progressing     Problem: Risk for Violence/Aggression Toward Others  Goal: Attend and participate in unit activities, including therapeutic, recreational, and educational groups  Description: Interventions:  - Provide therapeutic and educational activities daily, encourage attendance and participation, and document same in the medical record   Outcome: Progressing

## 2023-10-30 NOTE — PLAN OF CARE
Problem: Ineffective Coping  Goal: Identifies healthy coping skills  Outcome: Progressing     Problem: Risk for Violence/Aggression Toward Others  Goal: Verbalize thoughts and feelings  Description: Interventions:  - Assess and re-assess patient's level of risk, every waking shift  - Engage patient in 1:1 interactions, daily, for a minimum of 15 minutes   - Allow patient to express feelings and frustrations in a safe and non-threatening manner   - Establish rapport/trust with patient   Outcome: Progressing  Goal: Refrain from harming others  Outcome: Progressing  Goal: Control angry outbursts  Description: Interventions:  - Monitor patient closely, per order  - Ensure early verbal de-escalation  - Monitor prn medication needs  - Set reasonable/therapeutic limits, outline behavioral expectations, and consequences   - Provide a non-threatening milieu, utilizing the least restrictive interventions   Outcome: Progressing  Goal: Attend and participate in unit activities, including therapeutic, recreational, and educational groups  Description: Interventions:  - Provide therapeutic and educational activities daily, encourage attendance and participation, and document same in the medical record   Outcome: Progressing

## 2023-10-30 NOTE — NURSING NOTE
Jaime smiles when approached. He reports sleeping is disturbed d/t "the hard bed." He currently denies SI/HI and hallucinations. He appears excited when talking about his possible discharge tomorrow. Encouraged to come to staff with any questions or concerns.

## 2023-10-30 NOTE — NURSING NOTE
Deepa Rios is calm upon approach, observed sitting in the dayroom, watching TV and socializing with select peers. Patient denies current SI/HI/AH/VH, attends scheduled groups and remains compliant with prescribed medications. Deepa Rios reports that he is scheduled for discharge tomorrow and endorses feeling of readiness, states, "I can finally get out of here and have more to do!" Patient reports some discomfort r/t beds on the unit. Deepa Rios was encourage to seek staff with any questions and/or concerns, verbalized understanding.

## 2023-10-30 NOTE — PROGRESS NOTES
10/30/23 3700   Team Meeting   Meeting Type Daily Rounds   Team Members Present   Team Members Present Physician;Nurse;; Other (Discipline and Name)   Physician Team Member Dr. Nayeli Gibson / Dr. Alberto Hannah / Rosendo Aguilar / Claudeen Barley Member [facility] Management Team Member Zee Rivers / Mika Victor / Parminder Hatfield / Devin Florez   Other (Discipline and Name) Sravan (Group Facilitator)   Patient/Family Present   Patient Present No   Patient's Family Present No       Status: Pt is reported to be pleasant and socializing with peers. Pt is reported to be denying SI, HI, and AVH. Pt is reported to be attending groups. Pt is reported to have slept through the night. Pt is reported to be encouraged to shower but has not yet. Medication: No medication changes and no PRNs given.      D/C: Pt is scheduled to be discharged tomorrow

## 2023-10-30 NOTE — CASE MANAGEMENT
[facility] [place] @[phone] to confirm discharge planning. CM stated that they have th pt still set ot be discharged tomorrow 10/31/2023. [place] stated that she is working on getting everything set up for the Pt to discharge. [place] stated that she can call @3pm today to confirm plans. CM stated that they will fax medication list to the Pt's pharmacy. CM faxed updated medication list to [facility] @[phone]. CM received a call from [place]. [facility] stated that they will not have new staff for the Pt until Friday 11/3/2023. [place] stated that the Pt had wanted to work with new staff so they were attempting to get that set up for him. CM stated they do not have grounds to keep the Pt. CM asked if the Pt's previous staff can be with him until he gets his new staff on Friday. [place] stated that they can try to do that but the Pt is on their final try with them and if another incident happens, he will end up going back to custodial. CM stated that they will talk to the Pt but believe he should be able to handle the staff until Friday. [place] stated that she will get it figured out and be at the unit around 1pm tomorrow to pick him up. CM met with Pt to discuss discharge planning. CM stated that the Pt can be discharged tomorrow but he will not have new staff until Friday. CM stated that they do not have ground to keep him in the hospital as the provider is not doing any medication changes. CM asked if the Pt can make it work with the previous staff until he is paired with new staff on Friday. Pt stated that he is ok with that. CM stated that [place] is planning on coming to get him around 1pm tomorrow. CM asked if the Pt is planning on showering tonight. Pt stated that he will shower after dinner and then head to bed. CM stated that they will check in with him tomorrow morning before he discharged to go over application for [facility]. Pt verbalized understanding.

## 2023-10-30 NOTE — PROGRESS NOTES
Progress Note - Behavioral Health     Tammi Heart 19 y.o. male MRN: [id number]   Unit/Bed#: [address] 202-02 Encounter: [id number]    Behavior over the last 24 hours: unchanged. Jodie Ordaz is a 77-year-old male with a history of mood disorder and intellectual disabilities who presents for psychiatric follow-up. Staff reports no behavioral issues overnight. He remains disheveled appearing and malodorous upon approach. Continues to demonstrate childlike behaviors but overall calm and cooperative. Denies any acute concerns. He is excited about upcoming discharge. Tolerating meals and medications.     Sleep: normal  Appetite: normal  Medication side effects: No   ROS: all other systems are negative    Mental Status Evaluation:    Appearance:  age appropriate, dressed in hospital attire, disheveled, malodorous, not wearing a shirt, bearded   Behavior:  pleasant, cooperative, calm, childlike at times   Speech:  slow, normal volume, sometimes delayed    Mood:  "I feel good"   Affect:  mood-congruent, bright    Thought Process:  organized, linear   Associations: [place] associations   Thought Content:  no overt delusions   Perceptual Disturbances: no auditory hallucinations, no visual hallucinations   Risk Potential: Suicidal ideation - None  Homicidal ideation - None  Potential for aggression - Not at present   Sensorium:  oriented to person, place, and time/date   Memory:  recent and remote memory grossly intact   Consciousness:  alert and awake   Attention/Concentration: attention span and concentration appear shorter than expected for age   Insight:  impaired due to intellectual disability   Judgment: impaired due to intellectual disability   Gait/Station: normal gait/station, in bed   Motor Activity: no abnormal movements       Vital signs in last 24 hours:    Temp:  [97.4 °F (36.3 °C)-98.4 °F (36.9 °C)] 97.4 °F (36.3 °C)  HR:  [69-88] 69  Resp:  [16-20] 16  BP: (105-135)/(55-91) 105/55    Laboratory results: I have personally reviewed all pertinent laboratory/tests results    Results from the past 24 hours: No results found for this or any previous visit (from the past 24 hour(s)). Most Recent Labs:   Lab Results   Component Value Date    WBC 7.41 10/24/2023    RBC 5.39 10/24/2023    HGB 16.3 10/24/2023    HCT 47.7 10/24/2023     10/24/2023    RDW 12.4 10/24/2023    NEUTROABS 3.76 10/24/2023    SODIUM 137 10/24/2023    K 4.0 10/24/2023     10/24/2023    CO2 26 10/24/2023    BUN 18 10/24/2023    CREATININE 0.68 10/24/2023    GLUC 99 10/24/2023    CALCIUM 9.6 10/24/2023    AST 32 10/24/2023    ALT 51 10/24/2023    ALKPHOS 41 10/24/2023    TP 7.6 10/24/2023    ALB 4.4 10/24/2023    TBILI 0.69 10/24/2023    CHOLESTEROL 202 (H) 10/24/2023    HDL 48 10/24/2023    TRIG 114 10/24/2023    LDLCALC 131 (H) 10/24/2023    NONHDLC 154 10/24/2023    LITHIUM 0.5 (L) 10/24/2023    IUO3HWWTCFHO 2.800 10/24/2023    SYPHILISAB Non-reactive 10/24/2023    HGBA1C 5.0 10/24/2023    EAG 97 10/24/2023       Progress Toward Goals: progressing, working on coping skills, discharge planning    Assessment/Plan   Principal Problem:    Mood disorder ([address])  Active Problems:    ADHD (attention deficit hyperactivity disorder)    Intermittent explosive disorder in adult    Autism spectrum disorder    Medical clearance for psychiatric admission    Medical marijuana use      Recommended Treatment:     Planned medication and treatment changes: All current active medications have been reviewed  Encourage group therapy, milieu therapy and occupational therapy  Behavioral Health checks every 7 minutes    Continue current medications. Discharge planning for tomorrow.     Current Facility-Administered Medications   Medication Dose Route Frequency Provider Last Rate    acetaminophen  650 mg Oral Q6H PRN Puma Leaks, PA-C      acetaminophen  650 mg Oral Q4H PRN Puma Leaks, PA-C      acetaminophen  975 mg Oral Q6H PRN Puma Leaks, PASonyaC aluminum-magnesium hydroxide-simethicone  30 mL Oral Q4H PRN Hayden Argyle, PA-C      artificial tear   Both Eyes 4x Daily PRN Hayden Argyle, PA-C      haloperidol lactate  2.5 mg Intramuscular Q6H PRN Max 4/day Hayden Argyle, PA-C      And    LORazepam  1 mg Intramuscular Q6H PRN Max 4/day Hayden Argyle, PA-C      And    benztropine  0.5 mg Intramuscular Q6H PRN Max 4/day Hayden Argyle, PA-C      benztropine  1 mg Intramuscular BID PRN Hayden Argyle, PA-C      haloperidol lactate  5 mg Intramuscular Q4H PRN Max 4/day Hayden Argyle, PA-C      And    LORazepam  2 mg Intramuscular Q4H PRN Max 4/day Hayden Argyle, PA-C      And    benztropine  1 mg Intramuscular Q4H PRN Max 4/day Hayden Argyle, PA-C      benztropine  1 mg Oral BID PRN Hayden Argyle, PA-C      bisacodyl  10 mg Rectal Daily PRN Hayden Argyle, PA-C      busPIRone  15 mg Oral BID Hayden Argyle, PA-C      haloperidol  2 mg Oral Q4H PRN Max 6/day Hayden Argyle, PA-C      haloperidol  5 mg Oral Q6H PRN Max 4/day Hayden Argyle, PA-C      haloperidol  5 mg Oral Q4H PRN Max 4/day Hayden Argyle, PA-C      hydrOXYzine HCL  25 mg Oral Q6H PRN Max 4/day Hayden Argyle, PA-C      hydrOXYzine HCL  50 mg Oral Q4H PRN Max 4/day Hayden Argyle, PA-C      Or    LORazepam  1 mg Intramuscular Q4H PRN Hayden Argyle, PA-C      lithium carbonate  900 mg Oral QPM Hayden Argyle, PA-C      LORazepam  1 mg Oral Q4H PRN Max 6/day Hayden Argyle, PA-C      Or    LORazepam  2 mg Intramuscular Q6H PRN Max 3/day Hayden Argyle, PA-C      magnesium hydroxide  30 mL Oral Daily PRN Hayden Argyle, PA-C      methocarbamol  500 mg Oral BID Hayden Argyle, PA-C      risperiDONE  3 mg Oral HS Hayden Argyle, PA-C      senna-docusate sodium  1 tablet Oral Daily PRN Rabia Ramirez PA-C      traZODone  50 mg Oral HS PRN Rabia Ramirez PA-C       Risks / Benefits of Treatment:    Risks, benefits, and possible side effects of medications explained to patient and patient verbalizes understanding and agreement for treatment. Counseling / Coordination of Care:    Patient's progress discussed with staff in treatment team meeting. Medications, treatment progress and treatment plan reviewed with patient.     Nano Marie PA-C 10/30/23

## 2023-10-30 NOTE — DISCHARGE INSTR - APPOINTMENTS
You have confirmed and will be discharged to address [address] [place] [address]. You have confirmed and will be contacted at phone number [phone]. Your  while you were at [facility] was Denise Almonte who can be reached at phone number [phone] and fax number [phone]. Nirmal Simmons or Marilyn, our Aziza and Sarita, will be calling you after your discharge, on the phone number that you provided. They will be available as an additional support, if needed. If you wish to speak with one of them, you may contact Nirmal Simmons at [phone] or Sol Murrell at [phone].

## 2023-10-30 NOTE — PLAN OF CARE
Problem: DISCHARGE PLANNING  Goal: Discharge to home or other facility with appropriate resources  Description: INTERVENTIONS:  - Identify barriers to discharge w/patient and caregiver  - Arrange for needed discharge resources and transportation as appropriate  - Identify discharge learning needs (meds, wound care, etc.)  - Arrange for interpretive services to assist at discharge as needed  - Refer to Case Management Department for coordinating discharge planning if the patient needs post-hospital services based on physician/advanced practitioner order or complex needs related to functional status, cognitive ability, or social support system  Outcome: Adequate for Discharge  Note: Pt is scheduled to be discharged on 10/31/2023 with [facility] through [facility] and [facility] services through [employer] and referral for [facility].

## 2023-10-30 NOTE — DISCHARGE INSTR - OTHER ORDERS
[place] is a confidential 7 days/week telephone support service manned by trained mental health consumers. Warmline operates daily but is not able to accept calls between 2AM-6AM.   Warmline provides support, a listening ear and can provide information about available services. Warmline specializes in the concerns of mental health consumers, their families and friends. However, we are also here for anyone who has a mental health concern, is confused about or just doesn't know anything about mental health or where to get information. To reach Maricruz, call [phone] accepts calls between 6:00 AM to 10:00 AM and from 4:00 PM to 12:00 AM.     Text CONNECT to 204633 from anywhere in the [facility], anytime, about any type of crisis. A live, trained Crisis Counselor receives the text and lets you know that they are here to listen. The volunteer Crisis Counselor will help you move from a hot moment to a cool moment. [facility] ([facility]) AT [facility] - licensed telephone and mobile crisis services that provide mental health assessments to all age groups regardless of income or insurance. Crisis Intervention operates 24-hour/7 days a week. [address]  [address]ecilio Amador assists consumer who are experiencing a mental health emergency and lack the resources to assist themselves. Immediate intervention for suicidal and depressed individuals with home visits/outreach being top priority. Crisis can be contacted at [phone]. The [facility] ([facility]) offers various education & support groups for you & your family. For more information visit their website at   http://www.StickyADS.tv/.  Dial 2-1-1 to get connected/get help.   Free, confidential information & referral available 24/7: Aging Services, Child & Youth Services, Counseling, Education/Training, Food/Shelter/Clothing, Health Services, Parenting, Substance Abuse, Support Groups, Volunteer Opportunities, & much more. [email or web address] [phone], Web: [email or web address], Email: Arturo Burden   ([phone]  For additional information, contact the Department of Human Services Information and Referral Unit at  ([phone] between the hours of 8:30 a.m. and 4:30 p.m., Monday through Friday. An assessment is the first step in the process for [facility] residents to get the assistance they need. Any of the providers listed below are able to complete an assessment. After contacting a provider, an appointment for the assessment is scheduled. During the appointment an  will gather information to determine the level of treatment that is needed. In addition, assistance will be provided in completing the necessary paperwork to access treatment including a liability determination, release(s) of information, and may also include an application to the Department of [address] [place] [address] for [address] [place] [address]. The assessment process may take up to 2 hours to complete. Upon completion of the paperwork and assessment process, the  will determine the recommended level of care needed and provide assistance with the referral process.  American Organization - provides substance abuse assessment services for adults. [address] [facility], [address] [place] [address]  Phone: [phone] ext. [phone]  Fax: [phone]  Walk in hours Mondays - Fridays 8AM-3PM     [facility] ([facility]) - provides screening and assessment, outpatient and intensive outpatient services for both adolescents and adults. Day and evening services available.   Sosa Cruz, [facility]  Phone: [phone]  Fax: [phone] [address] - is a dual diagnosis outpatient program that provides assessment / treatment recommendations, individual, group and family therapy, intensive outpatient therapy, outpatient therapy, professional consultations, educational presentations and workshops, and urine screens for drugs of abuse. [facility]  [address], [address] [facility], [facility]  Phone: [phone]  Fax: [phone]  Walk in hours Mondays 9AM-5PM and Tuesdays - Fridays 9AM-2PM     [facility], [place] - provides intensive outpatient and outpatient treatment services to adolescents, adults and families experiencing drug and/or alcohol problems. Treatment modalities include individual, group and family counseling. Weekend DUI classes are available. [facility] is a division of [facility], [address] [place] [address]  Phone: [phone]  Fax: [phone]  Walk in hours Mondays - Fridays 8:30AM-3:30PM      Primary Care Doctor Services    When your insurance becomes active Medical Assistance will send you paperwork to choose your primary care doctor. If you should need services before that below are options that work with uninsured patients. [address] [facility] provides comprehensive well and sick primary care, OB/GYN, dental and pediatric health services to the medically underserved, including the uninsured and underinsured. [facility] [address] [place] [address] provides services in a community-based setting with experienced and compassionate physicians and other providers. It supports the health and wellness goals of local residents and specializes in providing improved access, coordinated care and enhanced patient / family involvement. [facility] [address]  Lina JJ  [phone]  Monday - Friday: 8 am - 5 pm  We provide care in a variety of areas, including: routine physical exams, wellness visits for infants through adults, including children's immunizations.  In addition, our services include blood tests and lab studies; women's health care, including Pap smears; care and management of diabetes, asthma and high blood pressure. [address]  [address] [facility] [address] [facility] [address]  [address] [facility]  Monday through Friday: 8 am - 5 pm  Saturday: 8 am - 1 pm  Health screenings  Preventive care  Care for acute illnesses and minor problems  Care of chronic illnesses  Physical examinations, including gynecological exams and Pap smears  Patient education  Immunizations  We provide care in a variety of areas, including: routine physical exams, wellness visits for infants through adults, including children's immunizations. In addition, our services include blood tests and lab studies; women's health care, including Pap smears; care and management of diabetes, asthma and high blood pressure. [address]  [address], [address]  PAR[place]Z, [address] [facility]  [phone]  Monday - Friday: 8 am - 5 pm  Health screenings  Preventive care  Care for acute illnesses and minor problems  Care of chronic illnesses  Physical examinations  Patient education  Immunizations  We offer assistance in accessing various resources through our , Financial Counselor and Outpatient Care Manager. In addition, we have a Certified  on site to help facilitate optimal communication with patients, care and management of diabetes and other chronic illnesses, including transitioning from hospital to home through specialized office visits. We can perform many point-of-care tests at the time of your visit including, EKG's, Hemoglobin A1C, blood glucose fingerstick, diabetic eye exams, urine dip, spirometry and hearing/vision tests.     [address]   LOCATION:  [facility]artha Paz at [place]  (located inside [facility])  [address], [address] [facility]  PHONE: [phone]  HOURS:  Monday: 8 am - 8 pm  Tuesday: 8 am - 8 pm  Wednesday: 8 am - 4:30 pm  Thursday: 8 am - 8 pm  Friday: 8 am - 6 pm  Saturday: Closed  Sunday: Closed    [facility]  LOCATION:  [address] Marjorie  PHONE: [phone]  HOURS:  Monday: 8 am - 4:30 pm  Tuesday: 8 am - 4:30 pm  Wednesday: 8 am - 4:30 pm  Thursday: 8 am - 4:30 pm  Friday: 8 am - 4:30 pm  Saturday: Closed  Sunday: Closed    [facility]  (inside [facility])  [address] [facility],[address], [facility], [address]  PHONE: [phone]  HOURS:  Monday: 8 am - 8 pm  Tuesday: 8 am - 4:30 pm  Wednesday: 8 am - 4:30 pm  Thursday: 8 am - 8 pm  Friday: 8 am - 4:30 pm  Saturday: Closed  Sunday: Closed    [address] [place] [address] Avenue:  [address] [facility] and [address]  [address] [facility] [place] ([place]), [address] [facility]  PHONE: [phone]  HOURS:  Please note that our hours have changed. Monday: 8 am - 8 pm  Tuesday: 8 am - 8 pm  Wednesday: 8 am - 4:30 pm  Thursday: 8 am - 8 pm  Friday: 8 am - 6 pm  Saturday: Closed  Sunday: Closed    STREET [address] [place] [address] is the main contact from [id number] [place] and [email or web address], her email contact is [facility]  Season runs November 1, 2021 through April 30, 2022. From November 1 through November 30   shelter will only be operating when it is 32 degrees or below. Visit website for status update. Winter shelter for single men and single women. Registration 7:00pm to 9:00pm (Only employed   guests with written proof of employment may enter station later). First come, first served. Lights   out at 10:30pm. Lights on at 5:45am. Meals will be served Monday through Friday for clients   staying there only. Clients are required to wear a mask with the exception of eating and sleeping.   Address:  [address] [place] [address] [address], [address] [place] [address]  Eligibility: Homeless single men and women 18 years and older who have no other shelter   options; Unable to serve families Must have no open criminal warrants or sexual offender status   found on background check through [employer],  and [employer]. Hours: Monday through Sunday, 7:00pm to 7:00am  Phone: [phone]      Sticky 2-1-1:   Call: 03.51.58.72.24 or [phone] Website: https://Easydiagnosis.[employer]/  This is a toll free, confidential; 24-hour-a-day service which connects you to a community  in your area who can help you find services and resources that are available to you locally and provide critical services that can improve and save lives. National Suicide Prevention Hotline  5-392.381.6659    Juanito Garay [facility]  3-401-339-110-174-5071    [place] Drug & Alcohol Helpline  2-187.537.5328    Crisis Text Line is free, 24/7 support for those in crisis.  Text HOME to 630000 from anywhere in the [facility] to text with a trained Crisis Counselor
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
## 2023-10-30 NOTE — BH TRANSITION RECORD
Contact Information: If you have any questions, concerns, pended studies, tests and/or procedures, or emergencies regarding your inpatient behavioral health visit. Please contact [address] behavioral health unit [phone] and ask to speak to a , nurse or physician. A contact is available 24 hours/ 7 days a week at this number. Summary of Procedures Performed During your Stay:  Below is a list of major procedures performed during your hospital stay and a summary of results:  - No major procedures performed. Pending Studies (From admission, onward)      None          Please follow up on the above pending studies with your PCP and/or referring provider.

## 2023-10-31 VITALS
WEIGHT: 288.7 LBS | OXYGEN SATURATION: 98 % | BODY MASS INDEX: 43.75 KG/M2 | DIASTOLIC BLOOD PRESSURE: 71 MMHG | HEART RATE: 68 BPM | RESPIRATION RATE: 18 BRPM | HEIGHT: 68 IN | TEMPERATURE: 97.9 F | SYSTOLIC BLOOD PRESSURE: 103 MMHG

## 2023-10-31 PROCEDURE — 99238 HOSP IP/OBS DSCHRG MGMT 30/<: CPT | Performed by: PSYCHIATRY & NEUROLOGY

## 2023-10-31 RX ORDER — METHOCARBAMOL 500 MG/1
500 TABLET, FILM COATED ORAL 2 TIMES DAILY
Qty: 60 TABLET | Refills: 0 | Status: SHIPPED | OUTPATIENT
Start: 2023-10-31 | End: 2023-11-30

## 2023-10-31 RX ORDER — RISPERIDONE 3 MG/1
3 TABLET ORAL
Qty: 30 TABLET | Refills: 0 | Status: SHIPPED | OUTPATIENT
Start: 2023-10-31 | End: 2023-11-30

## 2023-10-31 RX ORDER — LITHIUM CARBONATE 450 MG
900 TABLET, EXTENDED RELEASE ORAL EVERY EVENING
Qty: 60 TABLET | Refills: 0 | Status: SHIPPED | OUTPATIENT
Start: 2023-10-31 | End: 2023-11-30

## 2023-10-31 RX ORDER — BUSPIRONE HYDROCHLORIDE 15 MG/1
15 TABLET ORAL 2 TIMES DAILY
Qty: 60 TABLET | Refills: 0 | Status: SHIPPED | OUTPATIENT
Start: 2023-10-31 | End: 2023-11-30

## 2023-10-31 RX ADMIN — METHOCARBAMOL 500 MG: 500 TABLET ORAL at 08:28

## 2023-10-31 RX ADMIN — BUSPIRONE HYDROCHLORIDE 15 MG: 15 TABLET ORAL at 08:28

## 2023-10-31 NOTE — PROGRESS NOTES
10/31/23 0750   Team Meeting   Meeting Type Daily Rounds   Team Members Present   Team Members Present Physician;Nurse;; Other (Discipline and Name)   Physician Team Member Dr. Jamaal Kaufman / Dr. Jorge Sebastian / Rosie Marie / Young Kim Team Member Roldan Salinas / Nursing Students   Care Management Team Member [place] / [facility] / Vik Argueta / Promise Quintero   Other (Discipline and Name) Sigmund (Group Facilitator)   Patient/Family Present   Patient Present No   Patient's Family Present No       Status: Pt is reported to be calm, socializing with peers, and attending groups. Pt is reported to be denying SI, HI, and AVH. Pt is reported to be ready for discharge. Pt is reported to have showered and slept through the night. Medication: No medication changes and no PRNs given.      D/C: Pt is being discharged today 10/31/2023 to return to his [facility] staff

## 2023-10-31 NOTE — NURSING NOTE
Pt expressed readiness for discharge, AVS reviewed and the patient denied any questions or concerns. Reports feeling safe and able to use coping skills upon departure from this unit. Patient walked out of facility safely by staff.

## 2023-10-31 NOTE — CASE MANAGEMENT
CM met with Pt to discuss discharge planning. CM and Pt completed a referral for [facility]. CM stated that they will email that to them today. CM stated that his grouphome will be here around 1pm to pick him up today. CM stated that he will have his new staff starting Friday so he will have to manage with the old staff until then. Pt stated he will be able to manage until then. CM stated that they will call [facility] to confirm when he next appointment is. Pt signed an MEKHI for [facility] and [address] [place] [address] Hi. CM called [facility] [phone] to inform them of the Pt's discharge date. [facility] stated that the Pt is scheduled with an appointment for 11/17/2023 @11:30am to meet with Dr. Isai Harris. CM stated that they will send discharge paperwork when it is completed. CM emailed referral to [facility]. CM informed Pt of appointment with [facility] and referral being sent for [facility].

## 2023-10-31 NOTE — PROGRESS NOTES
10/31/23 0900   Activity/Group Checklist   Group Admission/Discharge  (Relapse prevention plan)   Attendance Did not attend  (Declined to complete relapse prevention plan upon invitation.)

## 2024-01-25 NOTE — PLAN OF CARE
201, brought in by [employer], admitted due to aggressive behaviors towards group home staff. Pt accuses group home of lying to get him admitted. At this time pt denies SI, Hi and AVH. Pt appears calm and cooperative. Continue to monitor.      Problem: DISCHARGE PLANNING  Goal: Discharge to home or other facility with appropriate resources  Description: INTERVENTIONS:  - Identify barriers to discharge w/patient and caregiver  - Arrange for needed discharge resources and transportation as appropriate  - Identify discharge learning needs (meds, wound care, etc.)  - Arrange for interpretive services to assist at discharge as needed  - Refer to Case Management Department for coordinating discharge planning if the patient needs post-hospital services based on physician/advanced practitioner order or complex needs related to functional status, cognitive ability, or social support system  Outcome: Progressing 127

## 2024-02-27 NOTE — NURSING NOTE
Pt visible on unit. Denies SI, HI and hallucinations. No anger or irritability noted. Calm and in behavioral control. Compliant with unit routines and care. Safety checks continue. <--- Click to Launch ICDx for PreOp, PostOp and Procedure

## 2024-07-19 NOTE — NURSING NOTE
Patient appeared to sleep well. Observed resting in bed with eyes closed, no signs of distress. Regular respirations. Continuous rounding maintained throughout shift for patient safety. No